# Patient Record
Sex: FEMALE | Race: WHITE | NOT HISPANIC OR LATINO | Employment: FULL TIME | URBAN - METROPOLITAN AREA
[De-identification: names, ages, dates, MRNs, and addresses within clinical notes are randomized per-mention and may not be internally consistent; named-entity substitution may affect disease eponyms.]

---

## 2018-06-18 ENCOUNTER — ANESTHESIA (OUTPATIENT)
Dept: SURGERY | Facility: HOSPITAL | Age: 73
End: 2018-06-18
Payer: MEDICARE

## 2018-06-18 ENCOUNTER — HOSPITAL ENCOUNTER (OUTPATIENT)
Facility: HOSPITAL | Age: 73
Discharge: HOME | End: 2018-06-19
Attending: ORTHOPAEDIC SURGERY | Admitting: ORTHOPAEDIC SURGERY
Payer: MEDICARE

## 2018-06-18 ENCOUNTER — ANESTHESIA EVENT (OUTPATIENT)
Dept: SURGERY | Facility: HOSPITAL | Age: 73
End: 2018-06-18
Payer: MEDICARE

## 2018-06-18 DIAGNOSIS — M79.673 HEEL PAIN: ICD-10-CM

## 2018-06-18 DIAGNOSIS — S92.009A CALCANEAL FRACTURE: Primary | ICD-10-CM

## 2018-06-18 DIAGNOSIS — M25.579 ANKLE PAIN: ICD-10-CM

## 2018-06-18 DIAGNOSIS — M79.673 FOOT PAIN: ICD-10-CM

## 2018-06-18 PROBLEM — S92.002A CALCANEUS FRACTURE, LEFT: Status: ACTIVE | Noted: 2018-06-18

## 2018-06-18 LAB
ALBUMIN SERPL BCP-MCNC: 3.9 G/DL
ALP SERPL-CCNC: 38 U/L
ALT SERPL W/O P-5'-P-CCNC: 21 U/L
ANION GAP SERPL CALC-SCNC: 10 MMOL/L
AST SERPL-CCNC: 25 U/L
BASOPHILS # BLD AUTO: 0.02 K/UL
BASOPHILS NFR BLD: 0.2 %
BILIRUB SERPL-MCNC: 0.6 MG/DL
BUN SERPL-MCNC: 13 MG/DL
CALCIUM SERPL-MCNC: 9.5 MG/DL
CHLORIDE SERPL-SCNC: 103 MMOL/L
CO2 SERPL-SCNC: 27 MMOL/L
CREAT SERPL-MCNC: 1 MG/DL
DIFFERENTIAL METHOD: ABNORMAL
EOSINOPHIL # BLD AUTO: 0.1 K/UL
EOSINOPHIL NFR BLD: 1 %
ERYTHROCYTE [DISTWIDTH] IN BLOOD BY AUTOMATED COUNT: 13 %
EST. GFR  (AFRICAN AMERICAN): >60 ML/MIN/1.73 M^2
EST. GFR  (NON AFRICAN AMERICAN): 57 ML/MIN/1.73 M^2
GLUCOSE SERPL-MCNC: 97 MG/DL
HCT VFR BLD AUTO: 44.1 %
HGB BLD-MCNC: 16 G/DL
LYMPHOCYTES # BLD AUTO: 1.9 K/UL
LYMPHOCYTES NFR BLD: 21 %
MCH RBC QN AUTO: 33.8 PG
MCHC RBC AUTO-ENTMCNC: 36.3 G/DL
MCV RBC AUTO: 93 FL
MONOCYTES # BLD AUTO: 0.7 K/UL
MONOCYTES NFR BLD: 7.7 %
NEUTROPHILS # BLD AUTO: 6.4 K/UL
NEUTROPHILS NFR BLD: 70.1 %
PLATELET # BLD AUTO: 201 K/UL
PMV BLD AUTO: 10.4 FL
POTASSIUM SERPL-SCNC: 3.5 MMOL/L
PROT SERPL-MCNC: 6.9 G/DL
RBC # BLD AUTO: 4.73 M/UL
SODIUM SERPL-SCNC: 140 MMOL/L
WBC # BLD AUTO: 9.11 K/UL

## 2018-06-18 PROCEDURE — 99204 OFFICE O/P NEW MOD 45 MIN: CPT | Mod: 57,AI,, | Performed by: ORTHOPAEDIC SURGERY

## 2018-06-18 PROCEDURE — 63600175 PHARM REV CODE 636 W HCPCS: Performed by: ANESTHESIOLOGY

## 2018-06-18 PROCEDURE — 63600175 PHARM REV CODE 636 W HCPCS: Performed by: ORTHOPAEDIC SURGERY

## 2018-06-18 PROCEDURE — 85025 COMPLETE CBC W/AUTO DIFF WBC: CPT

## 2018-06-18 PROCEDURE — 93010 ELECTROCARDIOGRAM REPORT: CPT | Mod: ,,, | Performed by: INTERNAL MEDICINE

## 2018-06-18 PROCEDURE — 63600175 PHARM REV CODE 636 W HCPCS: Performed by: NURSE ANESTHETIST, CERTIFIED REGISTERED

## 2018-06-18 PROCEDURE — C1713 ANCHOR/SCREW BN/BN,TIS/BN: HCPCS | Performed by: ORTHOPAEDIC SURGERY

## 2018-06-18 PROCEDURE — 25000003 PHARM REV CODE 250: Performed by: NURSE ANESTHETIST, CERTIFIED REGISTERED

## 2018-06-18 PROCEDURE — C1769 GUIDE WIRE: HCPCS | Performed by: ORTHOPAEDIC SURGERY

## 2018-06-18 PROCEDURE — 36000709 HC OR TIME LEV III EA ADD 15 MIN: Performed by: ORTHOPAEDIC SURGERY

## 2018-06-18 PROCEDURE — 71000039 HC RECOVERY, EACH ADD'L HOUR: Performed by: ORTHOPAEDIC SURGERY

## 2018-06-18 PROCEDURE — 25000003 PHARM REV CODE 250: Performed by: PHYSICIAN ASSISTANT

## 2018-06-18 PROCEDURE — 64445 NJX AA&/STRD SCIATIC NRV IMG: CPT | Mod: 59 | Performed by: ANESTHESIOLOGY

## 2018-06-18 PROCEDURE — 36000708 HC OR TIME LEV III 1ST 15 MIN: Performed by: ORTHOPAEDIC SURGERY

## 2018-06-18 PROCEDURE — 99285 EMERGENCY DEPT VISIT HI MDM: CPT | Mod: 25

## 2018-06-18 PROCEDURE — 80053 COMPREHEN METABOLIC PANEL: CPT

## 2018-06-18 PROCEDURE — 36415 COLL VENOUS BLD VENIPUNCTURE: CPT

## 2018-06-18 PROCEDURE — 71000033 HC RECOVERY, INTIAL HOUR: Performed by: ORTHOPAEDIC SURGERY

## 2018-06-18 PROCEDURE — 28415 OPTX CALCANEAL FRACTURE: CPT | Mod: AS,LT,, | Performed by: PHYSICIAN ASSISTANT

## 2018-06-18 PROCEDURE — 27201423 OPTIME MED/SURG SUP & DEVICES STERILE SUPPLY: Performed by: ORTHOPAEDIC SURGERY

## 2018-06-18 PROCEDURE — 28415 OPTX CALCANEAL FRACTURE: CPT | Mod: LT,,, | Performed by: ORTHOPAEDIC SURGERY

## 2018-06-18 PROCEDURE — 76942 ECHO GUIDE FOR BIOPSY: CPT | Performed by: ANESTHESIOLOGY

## 2018-06-18 PROCEDURE — 63600175 PHARM REV CODE 636 W HCPCS: Performed by: PHYSICIAN ASSISTANT

## 2018-06-18 PROCEDURE — 37000008 HC ANESTHESIA 1ST 15 MINUTES: Performed by: ORTHOPAEDIC SURGERY

## 2018-06-18 PROCEDURE — 37000009 HC ANESTHESIA EA ADD 15 MINS: Performed by: ORTHOPAEDIC SURGERY

## 2018-06-18 RX ORDER — FENTANYL CITRATE 50 UG/ML
INJECTION, SOLUTION INTRAMUSCULAR; INTRAVENOUS
Status: DISCONTINUED | OUTPATIENT
Start: 2018-06-18 | End: 2018-06-18

## 2018-06-18 RX ORDER — ROPIVACAINE HYDROCHLORIDE 5 MG/ML
INJECTION, SOLUTION EPIDURAL; INFILTRATION; PERINEURAL
Status: DISCONTINUED | OUTPATIENT
Start: 2018-06-18 | End: 2018-06-18

## 2018-06-18 RX ORDER — CHLORHEXIDINE GLUCONATE ORAL RINSE 1.2 MG/ML
10 SOLUTION DENTAL 2 TIMES DAILY
Status: DISCONTINUED | OUTPATIENT
Start: 2018-06-18 | End: 2018-06-19 | Stop reason: HOSPADM

## 2018-06-18 RX ORDER — GLYCOPYRROLATE 0.2 MG/ML
INJECTION INTRAMUSCULAR; INTRAVENOUS
Status: DISCONTINUED | OUTPATIENT
Start: 2018-06-18 | End: 2018-06-18

## 2018-06-18 RX ORDER — SODIUM CHLORIDE, SODIUM LACTATE, POTASSIUM CHLORIDE, CALCIUM CHLORIDE 600; 310; 30; 20 MG/100ML; MG/100ML; MG/100ML; MG/100ML
INJECTION, SOLUTION INTRAVENOUS CONTINUOUS PRN
Status: DISCONTINUED | OUTPATIENT
Start: 2018-06-18 | End: 2018-06-18

## 2018-06-18 RX ORDER — NEOSTIGMINE METHYLSULFATE 1 MG/ML
INJECTION, SOLUTION INTRAVENOUS
Status: DISCONTINUED | OUTPATIENT
Start: 2018-06-18 | End: 2018-06-18

## 2018-06-18 RX ORDER — ROCURONIUM BROMIDE 10 MG/ML
INJECTION, SOLUTION INTRAVENOUS
Status: DISCONTINUED | OUTPATIENT
Start: 2018-06-18 | End: 2018-06-18

## 2018-06-18 RX ORDER — CEFAZOLIN SODIUM 1 G/3ML
2 INJECTION, POWDER, FOR SOLUTION INTRAMUSCULAR; INTRAVENOUS
Status: DISCONTINUED | OUTPATIENT
Start: 2018-06-18 | End: 2018-06-18 | Stop reason: ALTCHOICE

## 2018-06-18 RX ORDER — OXYCODONE HYDROCHLORIDE 5 MG/1
5 TABLET ORAL
Status: DISCONTINUED | OUTPATIENT
Start: 2018-06-18 | End: 2018-06-18 | Stop reason: HOSPADM

## 2018-06-18 RX ORDER — PROPOFOL 10 MG/ML
VIAL (ML) INTRAVENOUS
Status: DISCONTINUED | OUTPATIENT
Start: 2018-06-18 | End: 2018-06-18

## 2018-06-18 RX ORDER — ONDANSETRON 4 MG/1
8 TABLET, ORALLY DISINTEGRATING ORAL EVERY 8 HOURS PRN
Qty: 30 TABLET | Refills: 0 | Status: ON HOLD | OUTPATIENT
Start: 2018-06-18 | End: 2018-07-13 | Stop reason: SDUPTHER

## 2018-06-18 RX ORDER — ONDANSETRON 2 MG/ML
INJECTION INTRAMUSCULAR; INTRAVENOUS
Status: DISCONTINUED | OUTPATIENT
Start: 2018-06-18 | End: 2018-06-18

## 2018-06-18 RX ORDER — HYDROMORPHONE HYDROCHLORIDE 1 MG/ML
0.2 INJECTION, SOLUTION INTRAMUSCULAR; INTRAVENOUS; SUBCUTANEOUS EVERY 5 MIN PRN
Status: DISCONTINUED | OUTPATIENT
Start: 2018-06-18 | End: 2018-06-18 | Stop reason: HOSPADM

## 2018-06-18 RX ORDER — LIDOCAINE HYDROCHLORIDE 10 MG/ML
INJECTION INFILTRATION; PERINEURAL
Status: DISCONTINUED | OUTPATIENT
Start: 2018-06-18 | End: 2018-06-18

## 2018-06-18 RX ORDER — HYDROCODONE BITARTRATE AND ACETAMINOPHEN 5; 325 MG/1; MG/1
1 TABLET ORAL EVERY 4 HOURS PRN
Status: DISCONTINUED | OUTPATIENT
Start: 2018-06-18 | End: 2018-06-19

## 2018-06-18 RX ORDER — ONDANSETRON 2 MG/ML
4 INJECTION INTRAMUSCULAR; INTRAVENOUS DAILY PRN
Status: DISCONTINUED | OUTPATIENT
Start: 2018-06-18 | End: 2018-06-18 | Stop reason: HOSPADM

## 2018-06-18 RX ORDER — ONDANSETRON 8 MG/1
8 TABLET, ORALLY DISINTEGRATING ORAL EVERY 8 HOURS PRN
Status: DISCONTINUED | OUTPATIENT
Start: 2018-06-18 | End: 2018-06-19 | Stop reason: HOSPADM

## 2018-06-18 RX ORDER — ACETAMINOPHEN 10 MG/ML
1000 INJECTION, SOLUTION INTRAVENOUS ONCE
Status: COMPLETED | OUTPATIENT
Start: 2018-06-18 | End: 2018-06-19

## 2018-06-18 RX ORDER — CEFAZOLIN SODIUM 2 G/50ML
2 SOLUTION INTRAVENOUS
Status: COMPLETED | OUTPATIENT
Start: 2018-06-18 | End: 2018-06-19

## 2018-06-18 RX ORDER — MORPHINE SULFATE 4 MG/ML
2 INJECTION, SOLUTION INTRAMUSCULAR; INTRAVENOUS EVERY 5 MIN PRN
Status: DISCONTINUED | OUTPATIENT
Start: 2018-06-18 | End: 2018-06-18 | Stop reason: HOSPADM

## 2018-06-18 RX ORDER — HYDROCODONE BITARTRATE AND ACETAMINOPHEN 10; 325 MG/1; MG/1
1 TABLET ORAL EVERY 4 HOURS PRN
Status: DISCONTINUED | OUTPATIENT
Start: 2018-06-18 | End: 2018-06-19

## 2018-06-18 RX ORDER — ROPIVACAINE HYDROCHLORIDE 5 MG/ML
INJECTION, SOLUTION EPIDURAL; INFILTRATION; PERINEURAL
Status: COMPLETED
Start: 2018-06-18 | End: 2018-06-18

## 2018-06-18 RX ORDER — HYDROCODONE BITARTRATE AND ACETAMINOPHEN 5; 325 MG/1; MG/1
TABLET ORAL
Qty: 30 TABLET | Refills: 0 | Status: SHIPPED | OUTPATIENT
Start: 2018-06-18 | End: 2018-06-19 | Stop reason: HOSPADM

## 2018-06-18 RX ORDER — MIDAZOLAM HYDROCHLORIDE 1 MG/ML
INJECTION, SOLUTION INTRAMUSCULAR; INTRAVENOUS
Status: DISCONTINUED | OUTPATIENT
Start: 2018-06-18 | End: 2018-06-18

## 2018-06-18 RX ORDER — SODIUM CHLORIDE 0.9 % (FLUSH) 0.9 %
3 SYRINGE (ML) INJECTION
Status: DISCONTINUED | OUTPATIENT
Start: 2018-06-18 | End: 2018-06-18 | Stop reason: HOSPADM

## 2018-06-18 RX ORDER — DOCUSATE SODIUM 100 MG/1
100 CAPSULE, LIQUID FILLED ORAL 2 TIMES DAILY
Refills: 0 | COMMUNITY
Start: 2018-06-18 | End: 2018-07-02

## 2018-06-18 RX ORDER — EPHEDRINE SULFATE 50 MG/ML
INJECTION, SOLUTION INTRAVENOUS
Status: DISCONTINUED | OUTPATIENT
Start: 2018-06-18 | End: 2018-06-18

## 2018-06-18 RX ADMIN — ROPIVACAINE HYDROCHLORIDE 30 ML: 5 INJECTION, SOLUTION EPIDURAL; INFILTRATION; PERINEURAL at 07:06

## 2018-06-18 RX ADMIN — CHLORHEXIDINE GLUCONATE 10 ML: 1.2 RINSE ORAL at 08:06

## 2018-06-18 RX ADMIN — PROPOFOL 100 MG: 10 INJECTION, EMULSION INTRAVENOUS at 05:06

## 2018-06-18 RX ADMIN — ONDANSETRON 4 MG: 2 INJECTION, SOLUTION INTRAMUSCULAR; INTRAVENOUS at 06:06

## 2018-06-18 RX ADMIN — FENTANYL CITRATE 50 MCG: 50 INJECTION, SOLUTION INTRAMUSCULAR; INTRAVENOUS at 04:06

## 2018-06-18 RX ADMIN — EPHEDRINE SULFATE 5 MG: 50 INJECTION, SOLUTION INTRAMUSCULAR; INTRAVENOUS; SUBCUTANEOUS at 06:06

## 2018-06-18 RX ADMIN — CEFAZOLIN SODIUM 2 G: 2 SOLUTION INTRAVENOUS at 11:06

## 2018-06-18 RX ADMIN — NEOSTIGMINE METHYLSULFATE 3 MG: 1 INJECTION INTRAVENOUS at 06:06

## 2018-06-18 RX ADMIN — MORPHINE SULFATE 2 MG: 4 INJECTION INTRAVENOUS at 06:06

## 2018-06-18 RX ADMIN — EPHEDRINE SULFATE 5 MG: 50 INJECTION, SOLUTION INTRAMUSCULAR; INTRAVENOUS; SUBCUTANEOUS at 05:06

## 2018-06-18 RX ADMIN — SODIUM CHLORIDE, SODIUM LACTATE, POTASSIUM CHLORIDE, AND CALCIUM CHLORIDE: 600; 310; 30; 20 INJECTION, SOLUTION INTRAVENOUS at 04:06

## 2018-06-18 RX ADMIN — ACETAMINOPHEN 1000 MG: 10 INJECTION, SOLUTION INTRAVENOUS at 07:06

## 2018-06-18 RX ADMIN — HYDROCODONE BITARTRATE AND ACETAMINOPHEN 1 TABLET: 5; 325 TABLET ORAL at 11:06

## 2018-06-18 RX ADMIN — MIDAZOLAM 2 MG: 1 INJECTION INTRAMUSCULAR; INTRAVENOUS at 04:06

## 2018-06-18 RX ADMIN — LIDOCAINE HYDROCHLORIDE 40 MG: 10 INJECTION, SOLUTION INFILTRATION; PERINEURAL at 04:06

## 2018-06-18 RX ADMIN — PROPOFOL 100 MG: 10 INJECTION, EMULSION INTRAVENOUS at 04:06

## 2018-06-18 RX ADMIN — ROBINUL 0.4 MG: 0.2 INJECTION INTRAMUSCULAR; INTRAVENOUS at 06:06

## 2018-06-18 RX ADMIN — FENTANYL CITRATE 100 MCG: 50 INJECTION, SOLUTION INTRAMUSCULAR; INTRAVENOUS at 04:06

## 2018-06-18 RX ADMIN — CEFAZOLIN 2 G: 1 INJECTION, POWDER, FOR SOLUTION INTRAMUSCULAR; INTRAVENOUS at 04:06

## 2018-06-18 RX ADMIN — FENTANYL CITRATE 50 MCG: 50 INJECTION, SOLUTION INTRAMUSCULAR; INTRAVENOUS at 05:06

## 2018-06-18 RX ADMIN — ROCURONIUM BROMIDE 40 MG: 10 INJECTION, SOLUTION INTRAVENOUS at 04:06

## 2018-06-18 RX ADMIN — SODIUM CHLORIDE, SODIUM LACTATE, POTASSIUM CHLORIDE, AND CALCIUM CHLORIDE: 600; 310; 30; 20 INJECTION, SOLUTION INTRAVENOUS at 05:06

## 2018-06-18 NOTE — H&P
"Subjective:     Patient ID: Nayana Bledsoe is a 73 y.o. female.    Chief Complaint: Foot Injury (L heel. Fell from 4ft ladder. Denies hitting head, no LOC. )    Left foot posterior pain. Unable to walk on it. Fell off 4 foot ladder today. No knee or hip pain significant. Unable to ambulate after her fall. No LOC.       Foot Injury    The pain is present in the left foot. This is a new problem. The current episode started today. There has been a history of trauma. The injury was the result of a falling action The problem has been rapidly worsening. The quality of the pain is described as aching, dull and sharp. Associated symptoms include an inability to bear weight. Pertinent negatives include no fever or itching. The symptoms are aggravated by activity and bearing weight.       History reviewed. No pertinent past medical history.  Past Surgical History:   Procedure Laterality Date    CHOLECYSTECTOMY      HYSTERECTOMY       History reviewed. No pertinent family history.  Social History     Social History    Marital status:      Spouse name: N/A    Number of children: N/A    Years of education: N/A     Occupational History    Not on file.     Social History Main Topics    Smoking status: Never Smoker    Smokeless tobacco: Never Used    Alcohol use Yes    Drug use: Unknown    Sexual activity: Not on file     Other Topics Concern    Not on file     Social History Narrative    No narrative on file     There are no discharge medications for this patient.    Review of patient's allergies indicates:   Allergen Reactions    Codeine      "i dont like the funny feeling it gives me"     Review of Systems   Constitution: Negative for fever.   HENT: Negative for sore throat.    Eyes: Negative for blurred vision.   Cardiovascular: Negative for dyspnea on exertion.   Respiratory: Negative for shortness of breath.    Hematologic/Lymphatic: Does not bruise/bleed easily.   Skin: Negative for itching. "   Gastrointestinal: Negative for vomiting.   Genitourinary: Negative for dysuria.   Neurological: Negative for dizziness.   Psychiatric/Behavioral: The patient does not have insomnia.        Objective:   Body mass index is 27.06 kg/m².  Vitals:    06/18/18 1245   BP: 130/70   Pulse: 70   Resp: 18   Temp: 98.1 °F (36.7 °C)           General    Nursing note and vitals reviewed.  Constitutional: She is oriented to person, place, and time. She appears well-developed. No distress.   HENT:   Head: Normocephalic and atraumatic.   Eyes: EOM are normal.   Cardiovascular: Normal rate.    Pulmonary/Chest: Effort normal. No stridor.   Neurological: She is alert and oriented to person, place, and time.   Psychiatric: She has a normal mood and affect. Her behavior is normal.         Left Ankle/Foot Exam     Inspection  Deformity: present  Bruising: Foot - present    Other   Sensation: normal    Comments:  Skin posteriorly with mild tenting. No virginie necrosis.      Vascular Exam       Left Pulses  Dorsalis Pedis:      2+            Non tender to palpation over the remainder of the left lower and right lower extremities. Tolerates ROM to knees and hips without pain or discomfort.      IMAGING AP and lateral oblique left foot and ankle views show tongue type calcaneus fracture with extension to the posterior facet. No other fracture or dislocation seen.    Assessment:     74 yo F with left ankle fracture achilles avulsion / tongue type fracture with posterior facet extension    Plan:       CT scan L foot and ankle to evaluate extension to the posterior facet - surgical planning  Recommend L foot calcaneus ORIF urgently to protect posterior skin  We reviewed with Virginia today, the pathology and natural history of her diagnosis.  I also explained the indications, risks and benefits of surgery. After discussion, Virginia decided to proceed with surgery. The decision was made to go forward with ORIF left calcaneus.    Soft tissue is  appropriate for surgery. No significant swelling or ecchymosis, excellent skin wrinkles.    The details of the surgical procedure were explained, including the location of probable incisions and a description of likely hardware and/or grafts to be used.  The patient understands the likely convalescence after surgery.  Also, we have thoroughly discussed the risks, benefits and alternatives to surgery, including, but not limited to, the risk of infection, joint stiffness, blood clot (including DVT and/or pulmonary embolus), neurologic and vascular injury.  It was explained that, if tissue has been repaired or reconstructed, there is a chance of failure, which may require further management.      All of the patient's questions were answered and informed consent was obtained. The patient will contact us if they have any questions or concerns in the interim.

## 2018-06-18 NOTE — ANESTHESIA PREPROCEDURE EVALUATION
06/18/2018  Nayana Bledsoe is a 73 y.o., female.    Anesthesia Evaluation    I have reviewed the Patient Summary Reports.        Review of Systems  Anesthesia Hx:  No problems with previous Anesthesia  History of prior surgery of interest to airway management or planning: Denies Family Hx of Anesthesia complications.   Denies Personal Hx of Anesthesia complications.   Cardiovascular:   Denies Hypertension.  Denies MI.  Denies CAD.    ECG has been reviewed. Carotid Stenosis   Pulmonary:   Denies COPD.  Denies Asthma.  Denies Shortness of breath.    Renal/:   Denies Chronic Renal Disease.     Hepatic/GI:   Hiatal Hernia, GERD    Neurological:   Denies CVA. Neuromuscular Disease,  Denies Seizures.    Endocrine:   Denies Diabetes.        Physical Exam  General:  Well nourished    Airway/Jaw/Neck:  Airway Findings: Mouth Opening: Normal General Airway Assessment: Adult  Mallampati: II       Chest/Lungs:  Chest/Lungs Findings: Clear to auscultation, Normal Respiratory Rate     Heart/Vascular:  Heart Findings: Rate: Normal  Rhythm: Regular Rhythm  Sounds: Normal             Anesthesia Plan  Type of Anesthesia, risks & benefits discussed:  Anesthesia Type:  general  Patient's Preference:   Intra-op Monitoring Plan: standard ASA monitors  Intra-op Monitoring Plan Comments:   Post Op Pain Control Plan: peripheral nerve block  Post Op Pain Control Plan Comments:   Induction:   IV  Beta Blocker:  Patient is not currently on a Beta-Blocker (No further documentation required).       Informed Consent: Patient understands risks and agrees with Anesthesia plan.  Questions answered.   ASA Score: 2  emergent   Day of Surgery Review of History & Physical:            Ready For Surgery From Anesthesia Perspective.

## 2018-06-18 NOTE — OP NOTE
"Operative Note       Surgery Date: 6/18/2018     Surgeon(s) and Role:     * Jim Maria MD - Primary  Tori Payton PA-C (Assisting)    The use of Tori Payton PA-C as an assistant was medically necessary for patient positioning, skin retraction, closure and assistance with this procedure. The procedure could not be performed properly without the use of her as an assistant. There was no qualified resident/fellow available for assistance with this procedure.       Pre-op Diagnosis:      Left closed displaced calcaneus fracture    Post-op Diagnosis: Same    Procedure(s) (LRB):      1. Open reduction and internal fixation left displaced calcaneus fracture  2. Application of posterior mold splint left leg    Anesthesia: General    Estimated Blood Loss: 50 cc           Specimens: None    Implants:   Implant Name Type Inv. Item Serial No.  Lot No. LRB No. Used   SCREW 4.5MM DALIA 44MM FTHD - [id number]  SCREW 4.5MM DALIA 44MM FTHD  SYNTHES  Left 1   K-WIRE THRD TRCR PT 4.5MM 1.6X - [id number]  K-WIRE THRD TRCR PT 4.5MM 1.6X  SYNTHES  Left 3   WIRE 2.8 X 300 MM THREADED - [id number]  WIRE 2.8 X 300 MM THREADED  SYNTHES  Left 1   6.5x40mm canulated screw           Left 2       OPERATIVE INDICATIONS: Nayana Bledsoe is a 73 y.o. female who presented to the ED with closed displaced left achilles avulsion / "tongue type" calcaneus fracture after a fall from a 4 foot ladder. Skin was tented but no open fracture. She denied other injury, no other extremity pain, no LOC. Due to urgency of the case given pressure on the skin, recommendation for left calcaneus open reduction and internal fixation. Soft tissues were otherwise appropriate. No significant swelling, excellent skin wrinkles. Risks of surgery including but not limited to infection, damage to blood vessels and nerves, tendons, fracture non union, fracture malunion, displacement or loss of reduction, hardware irritation, risks of anesthesia " including but not limited to heart attack, stroke, death, possible need for more surgery. She and her family expressed good understanding and wished to proceed. No guarantees given nor implied.     OPERATIVE DETAILS:    The patient was brought to the operating room and general anesthesia was induced without incident. The patient was positioned in the lateral decubitus position with the left side up, all bony prominences well padded. Padded peroneal nerve. The left lower extremity was prepped and draped in normal sterile fashion. Timeout was performed identifying proper patient, proper procedure and proper extremity and site, all in the room agreed. She received 2 g of ancef before skin incision. The incision was marked for limited approach just lateral to the achilles tendon. Skin incision was made only with 15 blade. Tenotomy scissors were used to develop the plane here and every effort was made to protect the sural nerve at all times. The fracture was identified and fracture hematoma was encountered and released. The edges of the fracture were identified and cleaned preserving soft tissue. Point to point clamp was used to carefully reduce the fracture and interdigitated nicely. This was checked on the lateral, Broden view, and Noel views and looked to be appropriate. Two 6.5 partially threaded stainless steel Synthes screws were placed bi cortically perpendicular to the fracture plane, anterior to the achilles tendon, and posterior to the posterior facet. These both had excellent purchase. Reduction forceps was removed and there was good maintenance of the fracture. With 5 degrees of dorsiflexion there was no gapping at the fracture. The wound was copiously irrigated with 3 L of normal saline. Hemostasis was achieved. The wound was closed in layers with 3-0 Vicryl for subcutaneous / dermal sutures. 4-0 Monocryl was used in interrupted fashion for the skin. Patient tolerated the procedure well with no  complications.  All sponge counts were correct at the end the procedure. Posterior mold splint was applied.  There was neutral flexion to the ankle. Splint was very well padded. She was transferred to the PACU in stable condition.

## 2018-06-18 NOTE — TRANSFER OF CARE
"Anesthesia Transfer of Care Note    Patient: Nayana Bledsoe    Procedure(s) Performed: Procedure(s) (LRB):  ORIF, FOOT (Left)    Patient location: PACU    Anesthesia Type: general    Transport from OR: Transported from OR on room air with adequate spontaneous ventilation    Post pain: adequate analgesia    Post assessment: no apparent anesthetic complications    Post vital signs: stable    Level of consciousness: awake, alert and oriented    Nausea/Vomiting: no nausea/vomiting    Complications: none    Transfer of care protocol was followed      Last vitals:   Visit Vitals  /70 (BP Location: Right arm, Patient Position: Sitting)   Pulse 70   Temp 36.7 °C (98.1 °F) (Oral)   Resp 18   Ht 4' 11" (1.499 m)   Wt 60.8 kg (134 lb)   SpO2 95%   Breastfeeding? No   BMI 27.06 kg/m²     "

## 2018-06-18 NOTE — ED PROVIDER NOTES
"Encounter Date: 6/18/2018       History     Chief Complaint   Patient presents with    Foot Injury     L heel. Fell from 4ft ladder. Denies hitting head, no LOC.      73 year old female with complaint of left heel pain X 2 hours.  Pt reports that she fell off a 4 foot ladder and she slipped and fell and landed on feet.  Reports immediate onset of pain.  Unable to bear weight.  No other complaints.  No back or knee pain.            Review of patient's allergies indicates:   Allergen Reactions    Codeine      "i dont like the funny feeling it gives me"     History reviewed. No pertinent past medical history.  Past Surgical History:   Procedure Laterality Date    CHOLECYSTECTOMY      HYSTERECTOMY       History reviewed. No pertinent family history.  Social History   Substance Use Topics    Smoking status: Never Smoker    Smokeless tobacco: Never Used    Alcohol use Yes     Review of Systems   Constitutional: Negative for fever.   HENT: Negative for sore throat.    Respiratory: Negative for shortness of breath.    Cardiovascular: Negative for chest pain.   Gastrointestinal: Negative for nausea.   Genitourinary: Negative for dysuria.   Musculoskeletal: Negative for back pain.        Left foot pain    Skin: Negative for rash.   Neurological: Negative for weakness.   Hematological: Does not bruise/bleed easily.       Physical Exam     Initial Vitals [06/18/18 1245]   BP Pulse Resp Temp SpO2   130/70 70 18 98.1 °F (36.7 °C) 95 %      MAP       --         Physical Exam    Nursing note and vitals reviewed.  Constitutional: She appears well-developed and well-nourished.   HENT:   Head: Normocephalic and atraumatic.   Eyes: Conjunctivae and EOM are normal. Pupils are equal, round, and reactive to light.   Neck: Normal range of motion. Neck supple.   Cardiovascular: Normal rate, regular rhythm, normal heart sounds and intact distal pulses.   Pulmonary/Chest: Breath sounds normal.   Abdominal: Soft. There is no tenderness. " There is no rebound and no guarding.   Musculoskeletal:   Tenderness and swelling with tenting of the skin left calcaneal region, mild left medial ankle tenderness and swelling, mild left lateral foot tenderness, no knee tenderness, no hip tenderness, no spine tenderness   Neurological: She is alert and oriented to person, place, and time. She has normal strength and normal reflexes.   Skin: Skin is warm and dry.   Psychiatric: She has a normal mood and affect. Her behavior is normal. Thought content normal.         ED Course   Procedures  Labs Reviewed   CBC W/ AUTO DIFFERENTIAL - Abnormal; Notable for the following:        Result Value    MCH 33.8 (*)     MCHC 36.3 (*)     All other components within normal limits   COMPREHENSIVE METABOLIC PANEL          X-Ray Foot Complete Left   Final Result      There is an acute appearing fracture involving the posterior aspect of the calcaneus. The bony fracture fragments of the calcaneus are displaced by 7 mm.         Electronically signed by: Ramez Parada MD   Date:    06/18/2018   Time:    13:55      X-Ray Ankle Complete Left   Final Result      There is an acute appearing fracture involving the posterior aspect of the calcaneus. The bony fracture fragments of the calcaneus are displaced by 7 mm.         Electronically signed by: Ramez Parada MD   Date:    06/18/2018   Time:    13:53      X-Ray Calcaneus 2 View Left   Final Result      There is an acute appearing fracture involving the posterior aspect of the calcaneus. The bony fracture fragments of the calcaneus are displaced by 7 mm.         Electronically signed by: Ramez Parada MD   Date:    06/18/2018   Time:    13:52      X-Ray Chest 1 View    (Results Pending)   CT Ankle (Including Hindfoot) Without Contrast Left    (Results Pending)         1:26 PM  Pt denies wanting pain medication at present                    Clinical Impression:   Diagnoses of Foot pain, Ankle pain, Heel pain, and Calcaneal fracture  were pertinent to this visit.                             Zachary Sandoval, VINCENT  06/18/18 4391

## 2018-06-19 VITALS
RESPIRATION RATE: 17 BRPM | BODY MASS INDEX: 27.33 KG/M2 | DIASTOLIC BLOOD PRESSURE: 54 MMHG | SYSTOLIC BLOOD PRESSURE: 104 MMHG | HEART RATE: 59 BPM | TEMPERATURE: 98 F | WEIGHT: 135.56 LBS | OXYGEN SATURATION: 98 % | HEIGHT: 59 IN

## 2018-06-19 PROBLEM — S92.002A CALCANEUS FRACTURE, LEFT: Chronic | Status: ACTIVE | Noted: 2018-06-18

## 2018-06-19 PROCEDURE — 97162 PT EVAL MOD COMPLEX 30 MIN: CPT

## 2018-06-19 PROCEDURE — G8979 MOBILITY GOAL STATUS: HCPCS | Mod: CI

## 2018-06-19 PROCEDURE — G8978 MOBILITY CURRENT STATUS: HCPCS | Mod: CJ

## 2018-06-19 PROCEDURE — 94761 N-INVAS EAR/PLS OXIMETRY MLT: CPT

## 2018-06-19 PROCEDURE — 97116 GAIT TRAINING THERAPY: CPT

## 2018-06-19 PROCEDURE — 63600175 PHARM REV CODE 636 W HCPCS: Performed by: PHYSICIAN ASSISTANT

## 2018-06-19 PROCEDURE — 25000003 PHARM REV CODE 250: Performed by: NURSE PRACTITIONER

## 2018-06-19 PROCEDURE — 25000003 PHARM REV CODE 250: Performed by: PHYSICIAN ASSISTANT

## 2018-06-19 PROCEDURE — 25000003 PHARM REV CODE 250: Performed by: ORTHOPAEDIC SURGERY

## 2018-06-19 RX ORDER — OXYCODONE AND ACETAMINOPHEN 7.5; 325 MG/1; MG/1
1 TABLET ORAL EVERY 4 HOURS PRN
Status: DISCONTINUED | OUTPATIENT
Start: 2018-06-19 | End: 2018-06-19 | Stop reason: HOSPADM

## 2018-06-19 RX ORDER — ENOXAPARIN SODIUM 100 MG/ML
40 INJECTION SUBCUTANEOUS DAILY
Status: DISCONTINUED | OUTPATIENT
Start: 2018-06-19 | End: 2018-06-19 | Stop reason: HOSPADM

## 2018-06-19 RX ORDER — CALCIUM CARBONATE 200(500)MG
500 TABLET,CHEWABLE ORAL DAILY PRN
Status: DISCONTINUED | OUTPATIENT
Start: 2018-06-19 | End: 2018-06-19 | Stop reason: HOSPADM

## 2018-06-19 RX ORDER — OXYCODONE AND ACETAMINOPHEN 7.5; 325 MG/1; MG/1
2 TABLET ORAL EVERY 4 HOURS PRN
Status: DISCONTINUED | OUTPATIENT
Start: 2018-06-19 | End: 2018-06-19 | Stop reason: HOSPADM

## 2018-06-19 RX ORDER — OXYCODONE AND ACETAMINOPHEN 5; 325 MG/1; MG/1
TABLET ORAL
Qty: 40 TABLET | Refills: 0 | Status: ON HOLD | OUTPATIENT
Start: 2018-06-19 | End: 2018-07-13 | Stop reason: SDUPTHER

## 2018-06-19 RX ORDER — RAMELTEON 8 MG/1
8 TABLET ORAL NIGHTLY PRN
Status: DISCONTINUED | OUTPATIENT
Start: 2018-06-19 | End: 2018-06-19 | Stop reason: HOSPADM

## 2018-06-19 RX ORDER — KETOROLAC TROMETHAMINE 30 MG/ML
30 INJECTION, SOLUTION INTRAMUSCULAR; INTRAVENOUS ONCE
Status: COMPLETED | OUTPATIENT
Start: 2018-06-19 | End: 2018-06-19

## 2018-06-19 RX ORDER — ASPIRIN 81 MG/1
81 TABLET ORAL DAILY
Refills: 0 | COMMUNITY
Start: 2018-06-20 | End: 2018-07-20

## 2018-06-19 RX ADMIN — CEFAZOLIN SODIUM 2 G: 2 SOLUTION INTRAVENOUS at 10:06

## 2018-06-19 RX ADMIN — CHLORHEXIDINE GLUCONATE 10 ML: 1.2 RINSE ORAL at 08:06

## 2018-06-19 RX ADMIN — RAMELTEON 8 MG: 8 TABLET, FILM COATED ORAL at 02:06

## 2018-06-19 RX ADMIN — OXYCODONE HYDROCHLORIDE AND ACETAMINOPHEN 2 TABLET: 7.5; 325 TABLET ORAL at 02:06

## 2018-06-19 RX ADMIN — HYDROCODONE BITARTRATE AND ACETAMINOPHEN 1 TABLET: 10; 325 TABLET ORAL at 03:06

## 2018-06-19 RX ADMIN — HYDROCODONE BITARTRATE AND ACETAMINOPHEN 1 TABLET: 10; 325 TABLET ORAL at 07:06

## 2018-06-19 RX ADMIN — CEFAZOLIN SODIUM 2 G: 2 SOLUTION INTRAVENOUS at 05:06

## 2018-06-19 RX ADMIN — CALCIUM CARBONATE (ANTACID) CHEW TAB 500 MG 500 MG: 500 CHEW TAB at 02:06

## 2018-06-19 RX ADMIN — KETOROLAC TROMETHAMINE 30 MG: 30 INJECTION, SOLUTION INTRAMUSCULAR; INTRAVENOUS at 03:06

## 2018-06-19 NOTE — PROGRESS NOTES
Post Operative Day 1:    Nursing staff states patent is unable to feel her foot status post left calcaneal surgery.  Patient states that after surgery she has lost sensation in her foot.  Patient did receive a postoperative nerve block under US guidance.       AA0x3, VSS.  No distress.      Patient has a soft cast covering from below the knee to her toes. Upon examination patient has sensation thru anterior and posterior aspect of proximal 1/3rd of calf.  Unable to examine portion of leg distal to this due to cast location.  Patient lacks both motor ability and sensation in all 5 toes.  Patient is able to bend knee without difficulty.     Notified Dr Maria and will have him examine patient leg.   Possible this deficit is secondary to nerve block.  If does not resolve in 24 hours, patient may need further work up.      -Tristian

## 2018-06-19 NOTE — CONSULTS
"CM spoke with Evelina with [facility]. CM received permission to pull walker. CM delivered walker to bedside. Blue instruction sheet for walker given [facility]. Delivery ticket signed by patient. Faxed completed packet to Aleja VASQUEZ [phone] with signed delivery ticket, order, facesheet and H&P.         Your fax has been successfully sent to [phone] at [phone].  ------------------------------------------------------------  From: [id number]  ------------------------------------------------------------  6/19/2018 12:30:31 PM Transmission Record   Sent to [phone] with remote ID "   Result: (3/259;0/0) Dial Er:no loop current   Page record: NONE SENT   Elapsed time: 00:36 on channel 58    6/19/2018 12:36:09 PM Transmission Record   Sent to [phone] with remote [facility] "   Result: (0/339;0/0) Success   Page record: 1 - 13   Elapsed time: 04:17 on channel 52      "

## 2018-06-19 NOTE — ANESTHESIA PROCEDURE NOTES
Peripheral    Patient location during procedure: pre-op   Block not for primary anesthetic.  Reason for block: at surgeon's request and post-op pain management   Post-op Pain Location: Left foot  Start time: 6/18/2018 7:07 PM  Timeout: 6/18/2018 7:01 PM   End time: 6/18/2018 7:07 PM  Surgery related to: Left calcaneous fx  Staffing  Anesthesiologist: BHAVANA CHILDERS  Performed: anesthesiologist   Preanesthetic Checklist  Completed: patient identified, site marked, surgical consent, pre-op evaluation, timeout performed, IV checked, risks and benefits discussed and monitors and equipment checked  Peripheral Block  Patient position: supine  Prep: ChloraPrep  Patient monitoring: heart rate, cardiac monitor, continuous pulse ox, continuous capnometry and frequent blood pressure checks  Block type: popliteal  Laterality: left  Injection technique: single shot  Needle  Needle type: Stimuplex   Needle gauge: 21 G  Needle length: 4 in  Needle localization: anatomical landmarks, ultrasound guidance, paresthesias and nerve stimulator   -ultrasound image captured on disc.  Assessment  Injection assessment: negative aspiration, negative parasthesia and local visualized surrounding nerve  Paresthesia pain: none  Heart rate change: no  Slow fractionated injection: yes  Medications:  Bolus administered: 30 mL of 0.5 ropivacaine  Additional Notes  VSS.  [facility] RN monitoring vitals throughout procedure.  Patient tolerated procedure well.

## 2018-06-19 NOTE — PROGRESS NOTES
Orthopedic Sports Surgery Rounding Note     2018    The patient is s/p ORIF of left calcaneal fracture. Patient's block is wearing off and she is able to have more ROM of her toes and more sensation.     Vital Signs:    Vitals:    18 0732 18 0831 18 1123 18 1542   BP: 105/71  119/66 (!) 104/54   BP Location: Left arm  Right arm Right arm   Patient Position:   Lying Lying   Pulse: 65 62 62 (!) 59   Resp: 15  16 17   Temp: 98.6 °F (37 °C)  98.2 °F (36.8 °C) 97.9 °F (36.6 °C)   TempSrc: Oral  Oral Oral   SpO2: (!) 94% 95% (!) 94% 98%   Weight:       Height:           Temp (48hrs), Av.1 °F (36.7 °C), Min:97.9 °F (36.6 °C), Max:98.6 °F (37 °C)      Recent Lab Results:    Recent Labs  Lab 18  1400      K 3.5      CO2 27   BUN 13   CREATININE 1.0   GLU 97   CALCIUM 9.5     Recent Labs      18   1400   WBC  9.11   HGB  16.0   HCT  44.1   PLT  201         Physical Exam:  A/O *3.     Left Lower Extremity  Dressing/Incision C/D/I  Decreased sensation over dorsum of foot and toes  No significant pain with passive ROM of toes    Assessment:  Left calcaneal fracture, S/P ORIF, POD # 1    Plan:  Pain Control with percocet 5-325 mg  PT/OT demonstrated gait training w/ walker  DVT prophylaxis with ASA for home use  Left LE NWB  D/C planning with [facility]  Patient is expected to be discharged home today  IV Toradol ordered for patient before she is discharged to home  Follow-up in orthopedic clinic in 1 week    Tori Payton PA-C  Orthopedic Surgery

## 2018-06-19 NOTE — PLAN OF CARE
Problem: Patient Care Overview  Goal: Plan of Care Review  Outcome: Ongoing (interventions implemented as appropriate)  Patient and vital signs stable. POC reviewed. IV antibiotics administered as ordered. No s/s of distress. Will continue to monitor.

## 2018-06-19 NOTE — CONSULTS
" faxed order for walker to [facility] -[phone]. Awaiting phone call from [facility] for delivery.       Your fax has been successfully sent to [phone] at [phone].  ------------------------------------------------------------  From: [id number]  ------------------------------------------------------------  6/19/2018 11:21:22 AM Transmission Record   Sent to [phone] with remote ID "[facility]ax "   Result: (0/339;0/0) Success   Page record: 1 - 11   Elapsed time: 03:38 on channel 26      "

## 2018-06-19 NOTE — PLAN OF CARE
06/19/18 1154   CORNEJO Message   Medicare Outpatient and Observation Notification regarding financial responsibility Given to patient/caregiver;Explained to patient/caregiver;Signed/date by patient/caregiver   Date CORNEJO was signed 06/19/18   Time CORNEJO was signed 1150

## 2018-06-19 NOTE — PLAN OF CARE
CM met with patient,  at bedside. Discharge planning assessment completed. Patient lives with . Discussed order for walker. No other d/c needs identified by patient at this time. Payor:[employer] MA[employer]ED [employer]IC[employer].       06/19/18 115   Discharge Assessment   Assessment Type Discharge Planning Assessment   Confirmed/corrected address and phone number on facesheet? Yes   Assessment information obtained from? Patient   Communicated expected length of stay with patient/caregiver yes   Prior to hospitilization cognitive status: Alert/Oriented   Prior to hospitalization functional status: Independent   Current cognitive status: Alert/Oriented   Current Functional Status: Needs Assistance;Assistive Equipment   Lives With spouse   Able to Return to Prior Arrangements yes   Is patient able to care for self after discharge? Yes   Who are your caregiver(s) and their phone number(s)? Codey Bledsoe son [phone]   Patient's perception of discharge disposition home or selfcare   Patient currently being followed by outpatient case management? Unable to determine (comments)   Patient currently receives any other outside agency services? No   Equipment Currently Used at Home walker, rolling   Do you have any problems affording any of your prescribed medications? No   Is the patient taking medications as prescribed? yes   Does the patient have transportation home? Yes   Transportation Available family or friend will provide   Dialysis Name and Scheduled days N/A   Does the patient receive services at the [facility]madin Clinic? No   Discharge Plan A Home with family   Discharge Plan B Home with family   Patient/Family In Agreement With Plan yes

## 2018-06-19 NOTE — OR NURSING
Left popliteal nerve block complete per Dr. Pedraza with no complications, will continue to monitor the patient closely.

## 2018-06-19 NOTE — PT/OT/SLP EVAL
Physical Therapy Evaluation    Patient Name:  Nayana Bledsoe   MRN:  [id number]    Recommendations:     Discharge Recommendations:  home health PT   Discharge Equipment Recommendations: none   Barriers to discharge: None    Assessment:     Nayana Bledsoe is a 73 y.o. female admitted with a medical diagnosis of Calcaneal fracture.  She presents with the following impairments/functional limitations:  gait instability, impaired endurance, decreased lower extremity function, decreased safety awareness, impaired self care skills, impaired functional mobilty, orthopedic precautions.    Rehab Prognosis:  GOOD; patient would benefit from acute skilled PT services to address these deficits and reach maximum level of function.      Recent Surgery: Procedure(s) (LRB):  ORIF, FOOT (Left) 1 Day Post-Op    Plan:     During this hospitalization, patient to be seen 5 x/week to address the above listed problems via gait training, therapeutic activities, therapeutic exercises  · Plan of Care Expires:  06/26/18   Plan of Care Reviewed with: patient, spouse    Subjective     Communicated with NURSE MILLS AND EPIC CHART REVIEW prior to session.  Patient found SUP IN BED upon PT entry to room, agreeable to evaluation.      Chief Complaint: IMPAIRED FUNCTIONAL MOBILITY  Patient comments/goals: RETURN TO PLOF  Pain/Comfort:  · Pain Rating 1: 7/10  · Location - Side 1: Left  · Location 1: ankle    Patients cultural, spiritual, Druze conflicts given the current situation:     Living Environment:  PT LIVES WITH HER  IN A ONE STORY HOME WITH NO STEPS TO ENTER. PT WORKS AS A  BUT IS OFF FOR THE SUMMER.   Prior to admission, patients level of function was I IN WALKING, DRIVING, BATHING/DRESSING.  Patient has the following equipment: none.  DME owned (not currently used): rolling walker, shower chair and transfer tub bench.  Upon discharge, patient will have assistance from .    Objective:     Patient found  with: telemetry     General Precautions: Standard  Orthopedic Precautions:LLE non weight bearing   Braces: N/A     Exams:  · Cognitive Exam:  Patient is oriented to Person, Place, Time and Situation and follows 100% of SIMPLE commands   · Gross Motor Coordination:  WFL  · Sensation:    · -       Intact  · RLE ROM: WNL  · RLE Strength: 5/5  · LLE ROM: WNL  · LLE Strength: 5/5 EXCEPT ANKLE NT    Functional Mobility:  · Bed Mobility:     · Supine to Sit: stand by assistance  · Transfers:     · Sit to Stand:  contact guard assistance with rolling walker  · Gait: GT 30' X 2 WITH RW AND BRENDON USING A 3 POINT GAIT PATTERN, CONSISTENT CUES FOR SAFETY  · Balance: FAIR-    AM-PAC 6 CLICK MOBILITY  Total Score:18     Therapeutic Activities and Exercises:   PT WAS QUICK AND IMPULSIVE WITH TF. PT WAS EDU ON SAFETY AWARENESS AND PROPER USE OF AD. PT DEMONSTRATED UNDERSTANDING OF USE OF AD VIA TEACH BACK METHOD.     Patient left up in chair with all lines intact, call button in reach, NURSE notified and  present.    GOALS:    Physical Therapy Goals        Problem: Physical Therapy Goal    Goal Priority Disciplines Outcome Goal Variances Interventions   Physical Therapy Goal     PT/OT, PT      Description:  LTG'S TO BE MET IN 7 DAYS (6/26/2018):   1. PT WILL BE SPV FOR BED MOB SUP<>SIT.   2. PT WILL BE SPV FOR TF SIT<>STAND WITH RW X 5 TRAILS.   3. PT WILL BE SPA FOR GT WITH RW NWB ON LLE ON SMOOTH LEVEL SURFACES '.  4. PT WILL DEMONSTRATE UNDERSTANDING OF PROPER USE OF AD.                     History:     Past Medical History:   Diagnosis Date    Acid reflux     Arthritis     Hiatal hernia        Past Surgical History:   Procedure Laterality Date    CARPAL TUNNEL RELEASE Right     CHOLECYSTECTOMY      COLONOSCOPY      ESOPHAGEAL DILATION      multiple times    HYSTERECTOMY      OPEN REDUCTION AND INTERNAL FIXATION (ORIF) OF INJURY OF FOOT Left 6/18/2018    Procedure: ORIF, FOOT;  Surgeon: Jim Maria,  MD;  Location: [facility];  Service: Orthopedics;  Laterality: Left;  Calcaneous    TONSILLECTOMY         Clinical Decision Making:     History  Co-morbidities and personal factors that may impact the plan of care Examination  Body Structures and Functions, activity limitations and participation restrictions that may impact the plan of care Clinical Presentation   Decision Making/ Complexity Score   Co-morbidities:   [] Time since onset of injury / illness / exacerbation  [] Status of current condition  []Patient's cognitive status and safety concerns    [] Multiple Medical Problems (see med hx)  Personal Factors:   [] Patient's age  [] Prior Level of function   [] Patient's home situation (environment and family support)  [] Patient's level of motivation  [] Expected progression of patient      HISTORY:(criteria)    [] 93926 - no personal factors/history    [] 31253 - has 1-2 personal factor/comorbidity     [] 15972 - has >3 personal factor/comorbidity     Body Regions:  [] Objective examination findings  [] Head     []  Neck  [] Trunk   [] Upper Extremity  [] Lower Extremity    Body Systems:  [] For communication ability, affect, cognition, language, and learning style: the assessment of the ability to make needs known, consciousness, orientation (person, place, and time), expected emotional /behavioral responses, and learning preferences (eg, learning barriers, education  needs)  [] For the neuromuscular system: a general assessment of gross coordinated movement (eg, balance, gait, locomotion, transfers, and transitions) and motor function  (motor control and motor learning)  [] For the musculoskeletal system: the assessment of gross symmetry, gross range of motion, gross strength, height, and weight  [] For the integumentary system: the assessment of pliability(texture), presence of scar formation, skin color, and skin integrity  [] For cardiovascular/pulmonary system: the assessment of heart rate, respiratory  rate, blood pressure, and edema     Activity limitations:    [] Patient's cognitive status and saf ety concerns          [] Status of current condition      [] Weight bearing restriction  [] Cardiopulmunary Restriction    Participation Restrictions:   [] Goals and goal agreement with the patient     [] Rehab potential (prognosis) and probable outcome      Examination of Body System: (criteria)    [] 96317 - addressing 1-2 elements    [] 04449 - addressing a total of 3 or more elements     [] 78931 -  Addressing a total of 4 or more elements         Clinical Presentation: (criteria)  Choose one     On examination of body system using standardized tests and measures patient presents with (CHOOSE ONE) elements from any of the following: body structures and functions, activity limitations, and/or participation restrictions.  Leading to a clinical presentation that is considered (CHOOSE ONE)                              Clinical Decision Making  (Eval Complexity):  Choose One     Time Tracking:     PT Received On: 06/19/18  PT Start Time: 0800     PT Stop Time: 0825  PT Total Time (min): 25 min     Billable Minutes: Evaluation 15 and Gait Training 10     PT WAS ADVISED TO CALL FOR ASSISTANCE WITH ALL NEEDS. PT WAS AGREEABLE.     Yoel Han, SPT  06/19/2018

## 2018-06-19 NOTE — NURSING
Patient received from PACU via stretcher. Bedside report received from JACKSON Wilkinson. Patient oriented to room, fall precautions, call light, hourly rounding and room service. Bed low and locked, alarm on. Please see flow sheet for further interventions.

## 2018-06-19 NOTE — NURSING
Discharge instructions discussed with patient and spouse.  Pt. And spouse verbalize understanding.  IV DC'd.  Hemostasis achieved.  Pt. Will be transported to the exit via wheelchair by nurse and driven home by .  PO pain medication and nausea medication delivered to the room by [facility].

## 2018-06-20 NOTE — DISCHARGE SUMMARY
[facility] -   Discharge Summary      Admit Date: 6/18/2018    Discharge Date and Time: 6/19/2018  4:13 PM    Attending Physician: No att. providers found     Reason for Admission: ORIF of left calcaneal fracture    Procedures Performed: Procedure(s) (LRB):  ORIF, FOOT (Left)    Hospital Course (synopsis of major diagnoses, care, treatment, and services provided during the course of the hospital stay):The patient was scheduled for an ORIF of a left calcaneus fracture. She was admitted for pain control under extended observation and discharged to home the next evening.     Consults: PT    Significant Diagnostic Studies: Radiology: X-Ray: I reviewed her x-rays and agree with the findings.    Final Diagnoses:    Principal Problem: Calcaneal fracture   Secondary Diagnoses:   Active Hospital Problems    Diagnosis  POA    *Calcaneal fracture [S92.009A]  Yes     Chronic    Calcaneus fracture, left [S92.002A]  Yes     Chronic      Resolved Hospital Problems    Diagnosis Date Resolved POA   No resolved problems to display.       Discharged Condition: fair    Disposition: Home or Self Care    Follow Up/Patient Instructions:     Medications:  Reconciled Home Medications:      Medication List      START taking these medications    aspirin 81 MG EC tablet  Commonly known as:  ECOTRIN  Take 1 tablet (81 mg total) by mouth once daily.     docusate sodium 100 MG capsule  Commonly known as:  COLACE  Take 1 capsule (100 mg total) by mouth 2 (two) times daily.     ondansetron 4 MG Tbdl  Commonly known as:  ZOFRAN-ODT  Take 2 tablets (8 mg total) by mouth every 8 (eight) hours as needed.     oxyCODONE-acetaminophen 5-325 mg per tablet  Commonly known as:  PERCOCET  Take 1-2 tablets every 4-8 hrs as needed for pain.        CONTINUE taking these medications    COMPOUND HORMONE REPLACEMENT  Take 1 tablet by mouth once daily.     multivitamin capsule  Take 1 capsule by mouth once daily.     UNKNOWN TO PATIENT       "      Discharge Procedure Orders  WALKER FOR HOME USE   Order Specific Question Answer Comments   Type of Walker: Adult (5'4"-6'6")    With wheels? Yes    Height: 4' 11" (1.499 m)    Weight: 60.8 kg (134 lb)    Length of need (1-99 months): 3    Please check all that apply: Patient's condition impairs ambulation.    Please check all that apply: Patient is unable to safely ambulate without equipment.      Diet general     Diet general     Call MD for:  temperature >100.4     Call MD for:  persistent nausea and vomiting     Call MD for:  severe uncontrolled pain     Call MD for:  difficulty breathing, headache or visual disturbances     Call MD for:  redness, tenderness, or signs of infection (pain, swelling, redness, odor or green/yellow discharge around incision site)     Call MD for:  hives     Ice to affected area     Keep surgical extremity elevated     Non weight bearing     Leave dressing on - Keep it clean, dry, and intact until clinic visit     Call MD for:  temperature >100.4     Call MD for:  persistent nausea and vomiting     Call MD for:  severe uncontrolled pain     Call MD for:  difficulty breathing, headache or visual disturbances     Call MD for:  redness, tenderness, or signs of infection (pain, swelling, redness, odor or green/yellow discharge around incision site)     Call MD for:  hives     Keep surgical extremity elevated     Ice to affected area     Non weight bearing     Leave dressing on - Keep it clean, dry, and intact until clinic visit       Follow-up Information     Jim Maria MD. Schedule an appointment as soon as possible for a visit in 1 week.    Specialty:  Orthopedic Surgery  Why:  For wound re-check  Contact information:  [address] [place] [address] Dr Eliu STOLL [id number] [phone]                 "

## 2018-06-26 DIAGNOSIS — R52 PAIN: Primary | ICD-10-CM

## 2018-06-27 ENCOUNTER — HOSPITAL ENCOUNTER (OUTPATIENT)
Dept: RADIOLOGY | Facility: HOSPITAL | Age: 73
Discharge: HOME | End: 2018-06-27
Attending: ORTHOPAEDIC SURGERY
Payer: MEDICARE

## 2018-06-27 ENCOUNTER — OFFICE VISIT (OUTPATIENT)
Dept: ORTHOPEDICS | Facility: CLINIC | Age: 73
End: 2018-06-27
Payer: MEDICARE

## 2018-06-27 VITALS
SYSTOLIC BLOOD PRESSURE: 114 MMHG | HEART RATE: 59 BPM | BODY MASS INDEX: 27.33 KG/M2 | WEIGHT: 135.56 LBS | DIASTOLIC BLOOD PRESSURE: 73 MMHG | HEIGHT: 59 IN

## 2018-06-27 DIAGNOSIS — R52 PAIN: ICD-10-CM

## 2018-06-27 DIAGNOSIS — S92.042D CLOSED DISPLACED FRACTURE OF TUBEROSITY OF LEFT CALCANEUS WITH ROUTINE HEALING, UNSPECIFIED FRACTURE MORPHOLOGY, SUBSEQUENT ENCOUNTER: Primary | ICD-10-CM

## 2018-06-27 PROCEDURE — 73650 X-RAY EXAM OF HEEL: CPT | Mod: 26,LT,, | Performed by: RADIOLOGY

## 2018-06-27 PROCEDURE — 99999 PR PBB SHADOW E&M-EST. PATIENT-LVL III: CPT | Mod: PBBFAC,,, | Performed by: ORTHOPAEDIC SURGERY

## 2018-06-27 PROCEDURE — 73650 X-RAY EXAM OF HEEL: CPT | Mod: TC,LT

## 2018-06-27 PROCEDURE — 99024 POSTOP FOLLOW-UP VISIT: CPT | Mod: S$GLB,,, | Performed by: ORTHOPAEDIC SURGERY

## 2018-06-27 RX ORDER — MAGNESIUM 250 MG
TABLET ORAL
COMMUNITY

## 2018-06-27 RX ORDER — PANTOPRAZOLE SODIUM 40 MG/1
40 TABLET, DELAYED RELEASE ORAL
COMMUNITY

## 2018-06-27 RX ORDER — IBUPROFEN 100 MG/5ML
1000 SUSPENSION, ORAL (FINAL DOSE FORM) ORAL
COMMUNITY

## 2018-06-27 RX ORDER — ESCITALOPRAM OXALATE 20 MG/1
TABLET ORAL
COMMUNITY
Start: 2017-10-02

## 2018-06-27 RX ORDER — TRAMADOL HYDROCHLORIDE 50 MG/1
50 TABLET ORAL EVERY 6 HOURS PRN
Qty: 30 TABLET | Refills: 0 | Status: SHIPPED | OUTPATIENT
Start: 2018-06-27 | End: 2018-07-07

## 2018-06-27 RX ORDER — FUROSEMIDE 40 MG/1
40 TABLET ORAL
COMMUNITY
Start: 2017-08-31

## 2018-06-27 NOTE — PROGRESS NOTES
Subjective:     Patient ID: Nayana Bledsoe is a 73 y.o. female.    Chief Complaint: Pain of the Left Foot    She is approximately 10 days out form left calcaneus ORIF. Doing well. No new injuries. States she has been NWB      Foot Pain    The pain is present in the left foot. This is a new problem. The current episode started 1 to 4 weeks ago. Movement associated with injury: falling off ladder.The problem occurs intermittently. Quality: pressure and discomfort. The pain is at a severity of 2/10. Pertinent negatives include no fever or itching. She has tried oral narcotics for the symptoms.       Past Medical History:   Diagnosis Date    Acid reflux     Arthritis     Hiatal hernia      Past Surgical History:   Procedure Laterality Date    CARPAL TUNNEL RELEASE Right     CHOLECYSTECTOMY      COLONOSCOPY      ESOPHAGEAL DILATION      multiple times    HYSTERECTOMY      OPEN REDUCTION AND INTERNAL FIXATION (ORIF) OF INJURY OF FOOT Left 6/18/2018    Procedure: ORIF, FOOT;  Surgeon: Jim Maria MD;  Location: [facility];  Service: Orthopedics;  Laterality: Left;  Calcaneous    TONSILLECTOMY       No family history on file.  Social History     Social History    Marital status:      Spouse name: N/A    Number of children: N/A    Years of education: N/A     Occupational History    Not on file.     Social History Main Topics    Smoking status: Never Smoker    Smokeless tobacco: Never Used    Alcohol use Yes      Comment: on weekends    Drug use: No    Sexual activity: Not on file     Other Topics Concern    Not on file     Social History Narrative    No narrative on file     Medication List with Changes/Refills   Current Medications    ASCORBIC ACID, VITAMIN C, (VITAMIN C) 1000 MG TABLET    Take 1,000 mg by mouth.    ASPIRIN (ECOTRIN) 81 MG EC TABLET    Take 1 tablet (81 mg total) by mouth once daily.    COMPOUND HORMONE REPLACEMENT    Take 1 tablet by mouth once daily.    ESCITALOPRAM  "OXALATE (LEXAPRO) 20 MG TABLET    Take one and one half tablet daily    FUROSEMIDE (LASIX) 40 MG TABLET    Take 40 mg by mouth.    MAGNESIUM 250 MG TAB    Take by mouth.    MULTIVITAMIN CAPSULE    Take 1 capsule by mouth once daily.    ONDANSETRON (ZOFRAN-ODT) 4 MG TBDL    Take 2 tablets (8 mg total) by mouth every 8 (eight) hours as needed.    OXYCODONE-ACETAMINOPHEN (PERCOCET) 5-325 MG PER TABLET    Take 1-2 tablets every 4-8 hrs as needed for pain.    PANTOPRAZOLE (PROTONIX) 40 MG TABLET    Take 40 mg by mouth.    UNKNOWN TO PATIENT         Review of patient's allergies indicates:   Allergen Reactions    Codeine      "i dont like the funny feeling it gives me"     Review of Systems   Constitution: Negative for fever.   HENT: Negative for sore throat.    Eyes: Negative for blurred vision.   Cardiovascular: Negative for dyspnea on exertion.   Respiratory: Negative for shortness of breath.    Hematologic/Lymphatic: Does not bruise/bleed easily.   Skin: Negative for itching.   Musculoskeletal: Positive for joint pain.   Gastrointestinal: Negative for vomiting.   Genitourinary: Negative for dysuria.   Neurological: Negative for dizziness.   Psychiatric/Behavioral: The patient does not have insomnia.        Objective:   Body mass index is 27.38 kg/m².  Vitals:    06/27/18 0958   BP: 114/73   Pulse: (!) 59                   Left Ankle/Foot Exam     Comments:  Incicision c/d/i no signs of infection. Scant bruising to wound edges.       EXAMINATION:  XR CALCANEUS 2 VIEW LEFT    CLINICAL HISTORY:  Pain, unspecified    TECHNIQUE:  Tangential and lateral views of the left calcaneus were performed.    COMPARISON:  06/18/2018    FINDINGS:  2 screws are again seen within the posterior calcaneus. Major fracture fragments are in near anatomic alignment. No hardware complication is seen.  No new abnormality.   Impression       As above      Electronically signed by: Jeovanny Andrade MD  Date: 06/27/2018  Time: 10:04 "         Assessment:     Encounter Diagnosis   Name Primary?    Closed displaced fracture of tuberosity of left calcaneus with routine healing, unspecified fracture morphology, subsequent encounter Yes        Plan:       NWB LLE  Transition to boot  Wait 1 week for sutures out

## 2018-06-29 ENCOUNTER — HOSPITAL ENCOUNTER (EMERGENCY)
Facility: HOSPITAL | Age: 73
Discharge: HOME | End: 2018-06-29
Attending: EMERGENCY MEDICINE
Payer: MEDICARE

## 2018-06-29 ENCOUNTER — TELEPHONE (OUTPATIENT)
Dept: ORTHOPEDICS | Facility: CLINIC | Age: 73
End: 2018-06-29

## 2018-06-29 VITALS
WEIGHT: 135 LBS | HEART RATE: 67 BPM | RESPIRATION RATE: 16 BRPM | OXYGEN SATURATION: 97 % | DIASTOLIC BLOOD PRESSURE: 64 MMHG | SYSTOLIC BLOOD PRESSURE: 126 MMHG | TEMPERATURE: 99 F | BODY MASS INDEX: 27.27 KG/M2

## 2018-06-29 DIAGNOSIS — R60.9 SWELLING: ICD-10-CM

## 2018-06-29 DIAGNOSIS — M79.605 PAIN OF LEFT LOWER EXTREMITY: ICD-10-CM

## 2018-06-29 DIAGNOSIS — G89.18 POST-OP PAIN: Primary | ICD-10-CM

## 2018-06-29 LAB
ALBUMIN SERPL BCP-MCNC: 3.6 G/DL
ALP SERPL-CCNC: 35 U/L
ALT SERPL W/O P-5'-P-CCNC: 22 U/L
ANION GAP SERPL CALC-SCNC: 10 MMOL/L
AST SERPL-CCNC: 21 U/L
BASOPHILS # BLD AUTO: 0.02 K/UL
BASOPHILS NFR BLD: 0.3 %
BILIRUB SERPL-MCNC: 0.6 MG/DL
BUN SERPL-MCNC: 14 MG/DL
CALCIUM SERPL-MCNC: 9.6 MG/DL
CHLORIDE SERPL-SCNC: 102 MMOL/L
CO2 SERPL-SCNC: 28 MMOL/L
CREAT SERPL-MCNC: 0.9 MG/DL
DIFFERENTIAL METHOD: ABNORMAL
EOSINOPHIL # BLD AUTO: 0.2 K/UL
EOSINOPHIL NFR BLD: 3.1 %
ERYTHROCYTE [DISTWIDTH] IN BLOOD BY AUTOMATED COUNT: 12.7 %
EST. GFR  (AFRICAN AMERICAN): >60 ML/MIN/1.73 M^2
EST. GFR  (NON AFRICAN AMERICAN): >60 ML/MIN/1.73 M^2
GLUCOSE SERPL-MCNC: 81 MG/DL
HCT VFR BLD AUTO: 42.4 %
HGB BLD-MCNC: 15.1 G/DL
LYMPHOCYTES # BLD AUTO: 2.3 K/UL
LYMPHOCYTES NFR BLD: 29.3 %
MCH RBC QN AUTO: 33.7 PG
MCHC RBC AUTO-ENTMCNC: 35.6 G/DL
MCV RBC AUTO: 95 FL
MONOCYTES # BLD AUTO: 0.8 K/UL
MONOCYTES NFR BLD: 10.4 %
NEUTROPHILS # BLD AUTO: 4.4 K/UL
NEUTROPHILS NFR BLD: 56.9 %
PLATELET # BLD AUTO: 272 K/UL
PMV BLD AUTO: 9.8 FL
POTASSIUM SERPL-SCNC: 4.1 MMOL/L
PROT SERPL-MCNC: 7.2 G/DL
RBC # BLD AUTO: 4.48 M/UL
SODIUM SERPL-SCNC: 140 MMOL/L
WBC # BLD AUTO: 7.67 K/UL

## 2018-06-29 PROCEDURE — 25500020 PHARM REV CODE 255: Performed by: EMERGENCY MEDICINE

## 2018-06-29 PROCEDURE — 85025 COMPLETE CBC W/AUTO DIFF WBC: CPT

## 2018-06-29 PROCEDURE — 80053 COMPREHEN METABOLIC PANEL: CPT

## 2018-06-29 PROCEDURE — 96360 HYDRATION IV INFUSION INIT: CPT | Mod: 59

## 2018-06-29 PROCEDURE — 99284 EMERGENCY DEPT VISIT MOD MDM: CPT | Mod: 25

## 2018-06-29 PROCEDURE — 25000003 PHARM REV CODE 250: Performed by: EMERGENCY MEDICINE

## 2018-06-29 RX ADMIN — IOHEXOL 100 ML: 350 INJECTION, SOLUTION INTRAVENOUS at 11:06

## 2018-06-29 RX ADMIN — SODIUM CHLORIDE 1000 ML: 0.9 INJECTION, SOLUTION INTRAVENOUS at 10:06

## 2018-06-29 NOTE — ED NOTES
In bed resting, VSS,aaox3,skin warm dry to touch,equal bilateral clear lung sounds,side rails up x 2,bed locked and in low position, plan of care discussed, oriented to surroundings, instructed to call with any needs. Patient denies any complaints at this time

## 2018-06-29 NOTE — ED PROVIDER NOTES
"            SCRIBE #1 NOTE: I, Nella David, am scribing for, and in the presence of, Suhas Nieto Jr., MD. I have scribed the entire note.      History      Chief Complaint   Patient presents with    Post-op Problem     heel fracture surgery 11 days ago; foot and leg turn purple when using walker; Dr. Maria told her to come to ED for eval        Review of patient's allergies indicates:   Allergen Reactions    Codeine      "i dont like the funny feeling it gives me"        HPI   HPI    6/29/2018, 10:06 AM   History obtained from the patient      History of Present Illness: Nayana Bledsoe is a 73 y.o. female patient who presents to the Emergency Department s/p open reduction of L calcaneus Fx for further evaluation. Pt reports her LLE turns purple/blue when she ambulates. She is wearing a walking boot currently and ambulates with a walker. She does not have orders to bear weight yet. Sxs are episodic and moderate in severity. Mitigated by elevating LLE. Associated sxs include SOB with mild exertion. She denies any fever, chills, CP, n/v, extremity weakness/numbness, calf pain, leg swelling, dizziness, syncope, palpitations, erythema, drainage, and all other sxs. No further complaints or concerns.       Arrival mode: Personal vehicle      PCP: Jonatan Joyce Iii, MD       Past Medical History:  Past Medical History:   Diagnosis Date    Acid reflux     Arthritis     Hiatal hernia        Past Surgical History:  Past Surgical History:   Procedure Laterality Date    CARPAL TUNNEL RELEASE Right     CHOLECYSTECTOMY      COLONOSCOPY      ESOPHAGEAL DILATION      multiple times    HYSTERECTOMY      OPEN REDUCTION AND INTERNAL FIXATION (ORIF) OF INJURY OF FOOT Left 6/18/2018    Procedure: ORIF, FOOT;  Surgeon: Jim Maria MD;  Location: [facility];  Service: Orthopedics;  Laterality: Left;  Calcaneous    TONSILLECTOMY           Family History:  History reviewed. No pertinent family history.    Social " History:  Social History     Social History Main Topics    Smoking status: Never Smoker    Smokeless tobacco: Never Used    Alcohol use Yes      Comment: on weekends    Drug use: No    Sexual activity: unknown       ROS   Review of Systems   Constitutional: Negative for chills and fever.   HENT: Negative for sore throat.    Respiratory: Negative for shortness of breath.    Cardiovascular: Negative for chest pain, palpitations and leg swelling.   Gastrointestinal: Negative for nausea and vomiting.   Genitourinary: Negative for dysuria.   Musculoskeletal: Negative for back pain and myalgias.   Skin: Positive for color change (LLE).        (-) erythema  (-) drainage   Neurological: Negative for dizziness, syncope, weakness and numbness.   Hematological: Does not bruise/bleed easily.   All other systems reviewed and are negative.    Physical Exam      Initial Vitals [06/29/18 1002]   BP Pulse Resp Temp SpO2   127/71 61 16 98 °F (36.7 °C) 95 %      MAP       --          Physical Exam  Nursing Notes and Vital Signs Reviewed.  Constitutional: Patient is in no acute distress. Well-developed and well-nourished.  Head: Atraumatic. Normocephalic.  Eyes: PERRL. EOM intact. Conjunctivae are not pale. No scleral icterus.  ENT: Mucous membranes are moist. Oropharynx is clear and symmetric.    Neck: Supple. Full ROM. No lymphadenopathy.  Cardiovascular: Regular rate. Regular rhythm. No murmurs, rubs, or gallops. Distal pulses are 2+ and symmetric.  Pulmonary/Chest: No respiratory distress. Clear to auscultation bilaterally. No wheezing or rales.  Abdominal: Soft and non-distended.  There is no tenderness.    Musculoskeletal: Moves all extremities. No obvious deformities. No edema. No calf tenderness.  LLE: no evident deformity. Negative for swelling. Negative for tenderness. ROM is normal. Cap refill distally is <2 seconds. DP and PT pulses are equal and 2+ bilaterally. No motor deficit. No distal sensory deficit.  Large  incisional wound extending over posterior aspect of calcaneous and laterally under foot on the heel. Mild amount of erythema surrounding sutures, otherwise no overt evidence of infection. No drainage, no fluctuance, no increased warmth.   Skin: Warm and dry.  Neurological:  Alert, awake, and appropriate.  Normal speech.  No acute focal neurological deficits are appreciated.  Psychiatric: Normal affect. Good eye contact. Appropriate in content.    ED Course    Procedures  ED Vital Signs:  Vitals:    06/29/18 1002 06/29/18 1131   BP: 127/71 133/62   Pulse: 61 (!) 56   Resp: 16    Temp: 98 °F (36.7 °C)    TempSrc: Oral    SpO2: 95% 96%   Weight: 61.2 kg (135 lb)        Abnormal Lab Results:  Labs Reviewed   CBC W/ AUTO DIFFERENTIAL - Abnormal; Notable for the following:        Result Value    MCH 33.7 (*)     All other components within normal limits   COMPREHENSIVE METABOLIC PANEL - Abnormal; Notable for the following:     Alkaline Phosphatase 35 (*)     All other components within normal limits        All Lab Results:  Results for orders placed or performed during the hospital encounter of 06/29/18   CBC auto differential   Result Value Ref Range    WBC 7.67 3.90 - 12.70 K/uL    RBC 4.48 4.00 - 5.40 M/uL    Hemoglobin 15.1 12.0 - 16.0 g/dL    Hematocrit 42.4 37.0 - 48.5 %    MCV 95 82 - 98 fL    MCH 33.7 (H) 27.0 - 31.0 pg    MCHC 35.6 32.0 - 36.0 g/dL    RDW 12.7 11.5 - 14.5 %    Platelets 272 150 - 350 K/uL    MPV 9.8 9.2 - 12.9 fL    Gran # (ANC) 4.4 1.8 - 7.7 K/uL    Lymph # 2.3 1.0 - 4.8 K/uL    Mono # 0.8 0.3 - 1.0 K/uL    Eos # 0.2 0.0 - 0.5 K/uL    Baso # 0.02 0.00 - 0.20 K/uL    Gran% 56.9 38.0 - 73.0 %    Lymph% 29.3 18.0 - 48.0 %    Mono% 10.4 4.0 - 15.0 %    Eosinophil% 3.1 0.0 - 8.0 %    Basophil% 0.3 0.0 - 1.9 %    Differential Method Automated    Comprehensive metabolic panel   Result Value Ref Range    Sodium 140 136 - 145 mmol/L    Potassium 4.1 3.5 - 5.1 mmol/L    Chloride 102 95 - 110 mmol/L    CO2  28 23 - 29 mmol/L    Glucose 81 70 - 110 mg/dL    BUN, Bld 14 8 - 23 mg/dL    Creatinine 0.9 0.5 - 1.4 mg/dL    Calcium 9.6 8.7 - 10.5 mg/dL    Total Protein 7.2 6.0 - 8.4 g/dL    Albumin 3.6 3.5 - 5.2 g/dL    Total Bilirubin 0.6 0.1 - 1.0 mg/dL    Alkaline Phosphatase 35 (L) 55 - 135 U/L    AST 21 10 - 40 U/L    ALT 22 10 - 44 U/L    Anion Gap 10 8 - 16 mmol/L    eGFR if African American >60 >60 mL/min/1.73 m^2    eGFR if non African American >60 >60 mL/min/1.73 m^2       Imaging Results:  Imaging Results          CTA Chest Non-Coronary (PE Study) (Final result)  Result time 06/29/18 12:11:27    Final result by AKI Parada Sr., MD (06/29/18 12:11:27)                 Impression:      1. There is no pulmonary embolism.  2. There is a subtle patchy area of increased density in the lateral aspect of the base of the left lower lobe.  This is characteristic of atelectasis or subtle pneumonia.  3. There is a large hiatal hernia.  4. There are mild degenerative changes in the spine.  All CT scans at this facility use dose modulation, iterative reconstruction, and/or weight base dosing when appropriate to reduce radiation dose when appropriate to reduce radiation dose to as low as reasonably achievable.      Electronically signed by: Ramez Parada MD  Date:    06/29/2018  Time:    12:11             Narrative:    EXAMINATION:  CTA CHEST NON CORONARY    CLINICAL HISTORY:  Chest pain, acute, PE suspected, high pretest prob;    TECHNIQUE:  Standard chest CT protocol was performed with IV contrast and 3D MIP reformats.  100 mL of Omnipaque 350 contrast material was used for this examination.    COMPARISON:  A chest x-ray performed on 06/18/2018.    FINDINGS:  The size of heart is within normal limits. The thoracic aorta is normal in appearance. There is no pulmonary embolism.  There is a subtle patchy area of increased density in the lateral aspect of the base of the left lower lobe.  There are several intrapulmonary  lymph nodes along the major fissure of the left lung.  The right lung is clear.  There is no pneumothorax or pleural effusion.  There is a large hiatal hernia.  There are mild degenerative changes in the spine.                               US Lower Extremity Veins Left (Final result)  Result time 06/29/18 11:37:23    Final result by AKI Parada Sr., MD (06/29/18 11:37:23)                 Impression:      1. No deep venous thrombosis  2. A superficial vein in the region of the left calf is occluded.      Electronically signed by: Ramez Parada MD  Date:    06/29/2018  Time:    11:37             Narrative:    EXAMINATION:  US LOWER EXTREMITY VEINS LEFT    CLINICAL HISTORY:  Edema, unspecified    TECHNIQUE:  Multiple static images are submitted for interpretation with color flow and spectral Doppler imaging.    COMPARISON:  None    FINDINGS:  The veins in the left lower extremity are normal in appearance and have normal compressibility. There are normal venous waveforms seen with augmentation during the compression of the veins. The left common femoral vein has a velocity of 5 cm/sec.  A superficial vein in the region of the left calf is occluded.                                        The Emergency Provider reviewed the vital signs and test results, which are outlined above.    ED Discussion     12:33 PM: Discussed pt's case with Dr. Maria (Orthopedics) who recommends having pt f/u in clinic.    12:58 PM: Reassessed pt at this time. Pt has an appointment with Dr. Maria in 1 week, she is to f/u as scheduled. Discussed with pt all pertinent ED information and results. Discussed pt dx and plan of tx. Gave pt all f/u and return to the ED instructions. All questions and concerns were addressed at this time. Pt expresses understanding of information and instructions, and is comfortable with plan to discharge. Pt is stable for discharge.    I discussed with patient and/or family/caretaker that evaluation in the  ED does not suggest any emergent or life threatening medical conditions requiring immediate intervention beyond what was provided in the ED, and I believe patient is safe for discharge.  Regardless, an unremarkable evaluation in the ED does not preclude the development or presence of a serious of life threatening condition. As such, patient was instructed to return immediately for any worsening or change in current symptoms.      ED Medication(s):  Medications   sodium chloride 0.9% bolus 1,000 mL (0 mLs Intravenous Stopped 6/29/18 1200)   omnipaque 350 iohexol 100 mL (100 mLs Intravenous Given 6/29/18 1153)       New Prescriptions    No medications on file       Follow-up Information     Jim Maria MD. Call in 2 days.    Specialty:  Orthopedic Surgery  Contact information:  [address] [place] [address] Dr Eliu STOLL [id number] [phone]                     Medical Decision Making    Medical Decision Making:   Clinical Tests:   Lab Tests: Ordered and Reviewed  Radiological Study: Ordered and Reviewed           Scribe Attestation:   Scribe #1: I performed the above scribed service and the documentation accurately describes the services I performed. I attest to the accuracy of the note.    Attending:   Physician Attestation Statement for Scribe #1: I, Suhas Nieto Jr., MD, personally performed the services described in this documentation, as scribed by Nella Hernandez, in my presence, and it is both accurate and complete.          Clinical Impression       ICD-10-CM ICD-9-CM   1. Post-op pain G89.18 338.18   2. Swelling R60.9 782.3   3. Pain of left lower extremity M79.605 729.5       Disposition:   Disposition: Discharged  Condition: Stable         Suhas Nieto Jr., MD  06/29/18 8190

## 2018-06-29 NOTE — TELEPHONE ENCOUNTER
Called pt to advise that Dr. Maria would like her to go yo the ED to have her foot checked. Pt vocalized understanding.

## 2018-07-06 ENCOUNTER — OFFICE VISIT (OUTPATIENT)
Dept: ORTHOPEDICS | Facility: CLINIC | Age: 73
End: 2018-07-06
Payer: MEDICARE

## 2018-07-06 VITALS
TEMPERATURE: 98 F | BODY MASS INDEX: 27.21 KG/M2 | DIASTOLIC BLOOD PRESSURE: 70 MMHG | SYSTOLIC BLOOD PRESSURE: 112 MMHG | HEART RATE: 59 BPM | WEIGHT: 135 LBS | HEIGHT: 59 IN

## 2018-07-06 DIAGNOSIS — R52 PAIN: Primary | ICD-10-CM

## 2018-07-06 DIAGNOSIS — S92.002D CLOSED DISPLACED FRACTURE OF LEFT CALCANEUS WITH ROUTINE HEALING, SUBSEQUENT ENCOUNTER: Primary | ICD-10-CM

## 2018-07-06 PROCEDURE — 99024 POSTOP FOLLOW-UP VISIT: CPT | Mod: S$GLB,,, | Performed by: ORTHOPAEDIC SURGERY

## 2018-07-06 PROCEDURE — 99999 PR PBB SHADOW E&M-EST. PATIENT-LVL III: CPT | Mod: PBBFAC,,, | Performed by: ORTHOPAEDIC SURGERY

## 2018-07-06 NOTE — PROGRESS NOTES
Subjective:     Patient ID: Nayana Bledsoe is a 73 y.o. female.    Chief Complaint: Pain of the Left Foot    She is 2 - 3 weeks out from tongue type calcaneus fracture ORIF. Has been NWB. No drainage. No fever or chills. No accidents or falls.      Foot Pain    The pain is present in the left foot. This is a new problem. The current episode started 1 to 4 weeks ago. Movement associated with injury: falling off ladder.The problem occurs intermittently. Quality: pressure and discomfort. The pain is at a severity of 0/10. Pertinent negatives include no fever or itching. She has tried oral narcotics for the symptoms.       Past Medical History:   Diagnosis Date    Acid reflux     Arthritis     Hiatal hernia      Past Surgical History:   Procedure Laterality Date    CARPAL TUNNEL RELEASE Right     CHOLECYSTECTOMY      COLONOSCOPY      ESOPHAGEAL DILATION      multiple times    HYSTERECTOMY      OPEN REDUCTION AND INTERNAL FIXATION (ORIF) OF INJURY OF FOOT Left 6/18/2018    Procedure: ORIF, FOOT;  Surgeon: Jim Maria MD;  Location: [facility];  Service: Orthopedics;  Laterality: Left;  Calcaneous    TONSILLECTOMY       History reviewed. No pertinent family history.  Social History     Social History    Marital status:      Spouse name: N/A    Number of children: N/A    Years of education: N/A     Occupational History    Not on file.     Social History Main Topics    Smoking status: Never Smoker    Smokeless tobacco: Never Used    Alcohol use Yes      Comment: on weekends    Drug use: No    Sexual activity: Not on file     Other Topics Concern    Not on file     Social History Narrative    No narrative on file     Medication List with Changes/Refills   Current Medications    ASCORBIC ACID, VITAMIN C, (VITAMIN C) 1000 MG TABLET    Take 1,000 mg by mouth.    ASPIRIN (ECOTRIN) 81 MG EC TABLET    Take 1 tablet (81 mg total) by mouth once daily.    COMPOUND HORMONE REPLACEMENT    Take 1  "tablet by mouth once daily.    ESCITALOPRAM OXALATE (LEXAPRO) 20 MG TABLET    Take one and one half tablet daily    FUROSEMIDE (LASIX) 40 MG TABLET    Take 40 mg by mouth.    MAGNESIUM 250 MG TAB    Take by mouth.    MULTIVITAMIN CAPSULE    Take 1 capsule by mouth once daily.    ONDANSETRON (ZOFRAN-ODT) 4 MG TBDL    Take 2 tablets (8 mg total) by mouth every 8 (eight) hours as needed.    OXYCODONE-ACETAMINOPHEN (PERCOCET) 5-325 MG PER TABLET    Take 1-2 tablets every 4-8 hrs as needed for pain.    PANTOPRAZOLE (PROTONIX) 40 MG TABLET    Take 40 mg by mouth.    TRAMADOL (ULTRAM) 50 MG TABLET    Take 1 tablet (50 mg total) by mouth every 6 (six) hours as needed for Pain.    UNKNOWN TO PATIENT         Review of patient's allergies indicates:   Allergen Reactions    Codeine      "i dont like the funny feeling it gives me"     Review of Systems   Constitution: Negative for fever.   HENT: Negative for sore throat.    Eyes: Negative for blurred vision.   Cardiovascular: Negative for dyspnea on exertion.   Respiratory: Negative for shortness of breath.    Hematologic/Lymphatic: Does not bruise/bleed easily.   Skin: Negative for itching.   Musculoskeletal: Positive for joint pain.   Gastrointestinal: Negative for vomiting.   Genitourinary: Negative for dysuria.   Neurological: Negative for dizziness.   Psychiatric/Behavioral: The patient does not have insomnia.        Objective:   Body mass index is 27.27 kg/m².  Vitals:    07/06/18 0717   BP: 112/70   Pulse: (!) 59   Temp: 97.9 °F (36.6 °C)           General    Nursing note and vitals reviewed.  Constitutional: She is oriented to person, place, and time. She appears well-developed. No distress.   HENT:   Head: Normocephalic and atraumatic.   Eyes: EOM are normal.   Cardiovascular: Normal rate.    Pulmonary/Chest: Effort normal. No stridor.   Neurological: She is alert and oriented to person, place, and time.   Psychiatric: She has a normal mood and affect. Her behavior is " normal.         Left Ankle/Foot Exam     Comments:  Skin intact left heel no signs infection.  Middle aspect of wound where skin was tenting from injury has scant edge early necrosis, no virginie dehiscence, this is superficial. Gentle ROM ankle without pain.             IMAGING no new radiographs    Assessment:     Encounter Diagnosis   Name Primary?    Closed displaced fracture of left calcaneus with routine healing, subsequent encounter Yes        Plan:     Non weight bearing  Okay gentle ROM ankle  In boot otherwise  Sutures removed  Steri strips applied  Watch wound healing  Follow up in 4 weeks with new xrays

## 2018-07-11 ENCOUNTER — HOSPITAL ENCOUNTER (OUTPATIENT)
Facility: HOSPITAL | Age: 73
Discharge: HOME | End: 2018-07-13
Attending: ORTHOPAEDIC SURGERY | Admitting: ORTHOPAEDIC SURGERY
Payer: MEDICARE

## 2018-07-11 DIAGNOSIS — S92.032B: Primary | Chronic | ICD-10-CM

## 2018-07-11 DIAGNOSIS — T81.89XA PROBLEM INVOLVING SURGICAL INCISION: ICD-10-CM

## 2018-07-11 DIAGNOSIS — T81.9XXA POSTOPERATIVE COMPLICATION: ICD-10-CM

## 2018-07-11 DIAGNOSIS — Z01.818 PREOP EXAMINATION: ICD-10-CM

## 2018-07-11 PROBLEM — E78.6 HDL LIPOPROTEIN DEFICIENCY: Status: ACTIVE | Noted: 2018-07-11

## 2018-07-11 PROBLEM — E78.1 HIGH TRIGLYCERIDES: Status: ACTIVE | Noted: 2018-07-11

## 2018-07-11 PROBLEM — R03.0 ELEVATED BP WITHOUT DIAGNOSIS OF HYPERTENSION: Status: RESOLVED | Noted: 2018-07-11 | Resolved: 2018-07-11

## 2018-07-11 PROBLEM — M19.041 PRIMARY OSTEOARTHRITIS OF BOTH HANDS: Status: ACTIVE | Noted: 2017-03-09

## 2018-07-11 PROBLEM — R03.0 ELEVATED BP WITHOUT DIAGNOSIS OF HYPERTENSION: Status: ACTIVE | Noted: 2018-07-11

## 2018-07-11 PROBLEM — M77.12 LATERAL EPICONDYLITIS OF LEFT ELBOW: Status: ACTIVE | Noted: 2017-03-09

## 2018-07-11 PROBLEM — M47.812 FACET ARTHROPATHY, CERVICAL: Status: ACTIVE | Noted: 2017-03-09

## 2018-07-11 PROBLEM — M25.549 ARTHRALGIA OF HAND: Status: ACTIVE | Noted: 2017-02-16

## 2018-07-11 PROBLEM — M17.0 PRIMARY OSTEOARTHRITIS OF BOTH KNEES: Status: ACTIVE | Noted: 2017-03-09

## 2018-07-11 PROBLEM — M19.042 PRIMARY OSTEOARTHRITIS OF BOTH HANDS: Status: ACTIVE | Noted: 2017-03-09

## 2018-07-11 PROBLEM — M86.9 OSTEOMYELITIS OF LEFT FOOT: Status: ACTIVE | Noted: 2018-07-11

## 2018-07-11 LAB
ANION GAP SERPL CALC-SCNC: 12 MMOL/L
BASOPHILS # BLD AUTO: 0.02 K/UL
BASOPHILS NFR BLD: 0.2 %
BUN SERPL-MCNC: 25 MG/DL
CALCIUM SERPL-MCNC: 9.2 MG/DL
CHLORIDE SERPL-SCNC: 105 MMOL/L
CO2 SERPL-SCNC: 22 MMOL/L
CREAT SERPL-MCNC: 1.2 MG/DL
DIFFERENTIAL METHOD: ABNORMAL
EOSINOPHIL # BLD AUTO: 0.1 K/UL
EOSINOPHIL NFR BLD: 1.1 %
ERYTHROCYTE [DISTWIDTH] IN BLOOD BY AUTOMATED COUNT: 13.2 %
EST. GFR  (AFRICAN AMERICAN): 53 ML/MIN/1.73 M^2
EST. GFR  (NON AFRICAN AMERICAN): 46 ML/MIN/1.73 M^2
GLUCOSE SERPL-MCNC: 89 MG/DL
HCT VFR BLD AUTO: 44.7 %
HGB BLD-MCNC: 16 G/DL
LYMPHOCYTES # BLD AUTO: 1.7 K/UL
LYMPHOCYTES NFR BLD: 15.4 %
MAGNESIUM SERPL-MCNC: 2.6 MG/DL
MCH RBC QN AUTO: 33.8 PG
MCHC RBC AUTO-ENTMCNC: 35.8 G/DL
MCV RBC AUTO: 94 FL
MONOCYTES # BLD AUTO: 0.9 K/UL
MONOCYTES NFR BLD: 7.8 %
NEUTROPHILS # BLD AUTO: 8.5 K/UL
NEUTROPHILS NFR BLD: 75.5 %
PHOSPHATE SERPL-MCNC: 3.1 MG/DL
PLATELET # BLD AUTO: 219 K/UL
PMV BLD AUTO: 10.5 FL
POTASSIUM SERPL-SCNC: 4.4 MMOL/L
RBC # BLD AUTO: 4.74 M/UL
SODIUM SERPL-SCNC: 139 MMOL/L
WBC # BLD AUTO: 11.18 K/UL

## 2018-07-11 PROCEDURE — S0028 INJECTION, FAMOTIDINE, 20 MG: HCPCS | Performed by: ORTHOPAEDIC SURGERY

## 2018-07-11 PROCEDURE — 85025 COMPLETE CBC W/AUTO DIFF WBC: CPT

## 2018-07-11 PROCEDURE — 96366 THER/PROPH/DIAG IV INF ADDON: CPT

## 2018-07-11 PROCEDURE — 93005 ELECTROCARDIOGRAM TRACING: CPT

## 2018-07-11 PROCEDURE — 96367 TX/PROPH/DG ADDL SEQ IV INF: CPT

## 2018-07-11 PROCEDURE — 96375 TX/PRO/DX INJ NEW DRUG ADDON: CPT

## 2018-07-11 PROCEDURE — 25000003 PHARM REV CODE 250: Performed by: ORTHOPAEDIC SURGERY

## 2018-07-11 PROCEDURE — 63600175 PHARM REV CODE 636 W HCPCS: Performed by: NURSE PRACTITIONER

## 2018-07-11 PROCEDURE — G0378 HOSPITAL OBSERVATION PER HR: HCPCS

## 2018-07-11 PROCEDURE — 96361 HYDRATE IV INFUSION ADD-ON: CPT

## 2018-07-11 PROCEDURE — 80048 BASIC METABOLIC PNL TOTAL CA: CPT

## 2018-07-11 PROCEDURE — 63600175 PHARM REV CODE 636 W HCPCS: Performed by: PHYSICIAN ASSISTANT

## 2018-07-11 PROCEDURE — 25000003 PHARM REV CODE 250: Performed by: PHYSICIAN ASSISTANT

## 2018-07-11 PROCEDURE — 99285 EMERGENCY DEPT VISIT HI MDM: CPT | Mod: 25

## 2018-07-11 PROCEDURE — 96365 THER/PROPH/DIAG IV INF INIT: CPT

## 2018-07-11 PROCEDURE — 83735 ASSAY OF MAGNESIUM: CPT

## 2018-07-11 PROCEDURE — 63600175 PHARM REV CODE 636 W HCPCS: Performed by: ORTHOPAEDIC SURGERY

## 2018-07-11 PROCEDURE — 93010 ELECTROCARDIOGRAM REPORT: CPT | Mod: ,,, | Performed by: INTERNAL MEDICINE

## 2018-07-11 PROCEDURE — 96372 THER/PROPH/DIAG INJ SC/IM: CPT | Mod: 59

## 2018-07-11 PROCEDURE — 84100 ASSAY OF PHOSPHORUS: CPT

## 2018-07-11 PROCEDURE — 36415 COLL VENOUS BLD VENIPUNCTURE: CPT

## 2018-07-11 RX ORDER — ONDANSETRON 2 MG/ML
4 INJECTION INTRAMUSCULAR; INTRAVENOUS EVERY 8 HOURS PRN
Status: DISCONTINUED | OUTPATIENT
Start: 2018-07-11 | End: 2018-07-12

## 2018-07-11 RX ORDER — DIPHENHYDRAMINE HYDROCHLORIDE 50 MG/ML
12.5 INJECTION INTRAMUSCULAR; INTRAVENOUS
Status: COMPLETED | OUTPATIENT
Start: 2018-07-11 | End: 2018-07-11

## 2018-07-11 RX ORDER — CEFAZOLIN SODIUM 2 G/50ML
2 SOLUTION INTRAVENOUS
Status: DISCONTINUED | OUTPATIENT
Start: 2018-07-11 | End: 2018-07-11

## 2018-07-11 RX ORDER — SODIUM CHLORIDE 9 MG/ML
INJECTION, SOLUTION INTRAVENOUS CONTINUOUS
Status: DISCONTINUED | OUTPATIENT
Start: 2018-07-11 | End: 2018-07-13 | Stop reason: HOSPADM

## 2018-07-11 RX ORDER — CEFAZOLIN SODIUM 1 G/3ML
2 INJECTION, POWDER, FOR SOLUTION INTRAMUSCULAR; INTRAVENOUS
Status: DISCONTINUED | OUTPATIENT
Start: 2018-07-11 | End: 2018-07-11 | Stop reason: SDUPTHER

## 2018-07-11 RX ORDER — MORPHINE SULFATE 4 MG/ML
2 INJECTION, SOLUTION INTRAMUSCULAR; INTRAVENOUS EVERY 4 HOURS PRN
Status: DISCONTINUED | OUTPATIENT
Start: 2018-07-11 | End: 2018-07-12

## 2018-07-11 RX ORDER — METHYLPREDNISOLONE SOD SUCC 125 MG
125 VIAL (EA) INJECTION
Status: COMPLETED | OUTPATIENT
Start: 2018-07-11 | End: 2018-07-11

## 2018-07-11 RX ORDER — PANTOPRAZOLE SODIUM 40 MG/1
40 TABLET, DELAYED RELEASE ORAL DAILY
Status: DISCONTINUED | OUTPATIENT
Start: 2018-07-12 | End: 2018-07-13 | Stop reason: HOSPADM

## 2018-07-11 RX ORDER — MORPHINE SULFATE 4 MG/ML
4 INJECTION, SOLUTION INTRAMUSCULAR; INTRAVENOUS EVERY 4 HOURS PRN
Status: DISCONTINUED | OUTPATIENT
Start: 2018-07-11 | End: 2018-07-11

## 2018-07-11 RX ORDER — HEPARIN SODIUM 5000 [USP'U]/ML
5000 INJECTION, SOLUTION INTRAVENOUS; SUBCUTANEOUS ONCE
Status: COMPLETED | OUTPATIENT
Start: 2018-07-11 | End: 2018-07-11

## 2018-07-11 RX ORDER — VANCOMYCIN HCL IN 5 % DEXTROSE 1G/250ML
1000 PLASTIC BAG, INJECTION (ML) INTRAVENOUS
Status: COMPLETED | OUTPATIENT
Start: 2018-07-11 | End: 2018-07-12

## 2018-07-11 RX ORDER — FAMOTIDINE 10 MG/ML
20 INJECTION INTRAVENOUS
Status: COMPLETED | OUTPATIENT
Start: 2018-07-11 | End: 2018-07-11

## 2018-07-11 RX ORDER — CEFAZOLIN SODIUM 2 G/50ML
2 SOLUTION INTRAVENOUS
Status: COMPLETED | OUTPATIENT
Start: 2018-07-11 | End: 2018-07-11

## 2018-07-11 RX ADMIN — VANCOMYCIN HYDROCHLORIDE 1000 MG: 1 INJECTION, POWDER, LYOPHILIZED, FOR SOLUTION INTRAVENOUS at 11:07

## 2018-07-11 RX ADMIN — SODIUM CHLORIDE: 0.9 INJECTION, SOLUTION INTRAVENOUS at 10:07

## 2018-07-11 RX ADMIN — SODIUM CHLORIDE: 0.9 INJECTION, SOLUTION INTRAVENOUS at 07:07

## 2018-07-11 RX ADMIN — HEPARIN SODIUM 5000 UNITS: 5000 INJECTION, SOLUTION INTRAVENOUS; SUBCUTANEOUS at 11:07

## 2018-07-11 RX ADMIN — DIPHENHYDRAMINE HYDROCHLORIDE 12.5 MG: 50 INJECTION, SOLUTION INTRAMUSCULAR; INTRAVENOUS at 11:07

## 2018-07-11 RX ADMIN — METHYLPREDNISOLONE SODIUM SUCCINATE 125 MG: 125 INJECTION, POWDER, FOR SOLUTION INTRAMUSCULAR; INTRAVENOUS at 11:07

## 2018-07-11 RX ADMIN — MORPHINE SULFATE 4 MG: 4 INJECTION INTRAVENOUS at 08:07

## 2018-07-11 RX ADMIN — FAMOTIDINE 20 MG: 10 INJECTION INTRAVENOUS at 11:07

## 2018-07-11 RX ADMIN — CEFAZOLIN SODIUM 2 G: 2 SOLUTION INTRAVENOUS at 08:07

## 2018-07-11 NOTE — ED PROVIDER NOTES
"SCRIBE #1 NOTE: I, Annamarie Shaw, am scribing for, and in the presence of, Minor Gant PA-C. I have scribed the entire note.      History      Chief Complaint   Patient presents with    Wound Check     Surgery 3 weeks ago to L foot. Stitches removed 1 week ago. Pt states she "reopened my wound." Bandage to L foot with some oozing of blood.        Review of patient's allergies indicates:   Allergen Reactions    Codeine      "i dont like the funny feeling it gives me"        HPI   HPI    7/11/2018, 6:25 PM   History obtained from the patient      History of Present Illness: Nayana Bledsoe is a 73 y.o. female patient who presents to the Emergency Department for a wound check. Pt is approximately 3 weeks S/P ORIF L calcaneous fx by Dr. Maria (Orthopedics). Tonight just PTA, she reports that she slipped while getting out of the tub and reflexively stepped down abruptly onto her recently surgically repaired left foot to prevent a fall. She states that her surgical incision has "split open." No associated or additional injuries or concerns.     Arrival mode: Personal vehicle    PCP: Jonatan Joyce Iii, MD       Past Medical History:  Past Medical History:   Diagnosis Date    Acid reflux     Arthritis     Hiatal hernia        Past Surgical History:  Past Surgical History:   Procedure Laterality Date    CARPAL TUNNEL RELEASE Right     CHOLECYSTECTOMY      COLONOSCOPY      ESOPHAGEAL DILATION      multiple times    HYSTERECTOMY      OPEN REDUCTION AND INTERNAL FIXATION (ORIF) OF INJURY OF FOOT Left 6/18/2018    Procedure: ORIF, FOOT;  Surgeon: Jim Maria MD;  Location: [facility];  Service: Orthopedics;  Laterality: Left;  Calcaneous    TONSILLECTOMY           Family History:  History reviewed. No pertinent family history.    Social History:  Social History     Social History Main Topics    Smoking status: Never Smoker    Smokeless tobacco: Never Used    Alcohol use Yes      Comment: on weekends " "   Drug use: No    Sexual activity: Not on file       ROS   Review of Systems   Constitutional: Negative for chills and fever.        (-) fall  (-) hitting L foot   Respiratory: Negative for cough, chest tightness and shortness of breath.    Cardiovascular: Negative for chest pain and leg swelling.   Gastrointestinal: Negative for abdominal pain, diarrhea, nausea and vomiting.   Genitourinary: Negative for decreased urine volume, dysuria and flank pain.   Musculoskeletal: Negative for arthralgias.   Skin: Positive for wound (L foot).   Neurological: Negative for dizziness, seizures, syncope, facial asymmetry, speech difficulty, weakness, light-headedness, numbness and headaches.   Psychiatric/Behavioral: Negative for confusion. The patient is nervous/anxious.      Physical Exam      Initial Vitals [07/11/18 1751]   BP Pulse Resp Temp SpO2   126/69 72 16 98.9 °F (37.2 °C) 95 %      MAP       --          Physical Exam  Nursing Notes and Vital Signs Reviewed.  Constitutional: Patient is in mild distress. She is accompanied by her family.   Head: Atraumatic.   Eyes: PERRL. EOM intact.   ENT: Mucous membranes are moist.    Cardiovascular: Regular rate. Regular rhythm.   Pulmonary/Chest: No respiratory distress.  Abdominal: Soft and non-tender.  Musculoskeletal: Moves all extremities.   Left ankle:  Sub-acute surgical incision to the posterior aspect of L heel that has an acute traumatic dehiscence - see image.   Skin: Warm and dry.  Neurological:  Alert, awake, and appropriate. AAO x 3. Normal speech. No acute focal neurological deficits are appreciated.  Psychiatric: Anxious.         ED Course    Procedures  ED Vital Signs:  Vitals:    07/11/18 1751   BP: 126/69   Pulse: 72   Resp: 16   Temp: 98.9 °F (37.2 °C)   TempSrc: Oral   SpO2: 95%   Weight: 61.2 kg (135 lb)   Height: 4' 11" (1.499 m)       Abnormal Lab Results:  Labs Reviewed   CBC W/ AUTO DIFFERENTIAL - Abnormal; Notable for the following:        Result Value "    MCH 33.8 (*)     Gran # (ANC) 8.5 (*)     Gran% 75.5 (*)     Lymph% 15.4 (*)     All other components within normal limits   BASIC METABOLIC PANEL - Abnormal; Notable for the following:     CO2 22 (*)     BUN, Bld 25 (*)     eGFR if  53 (*)     eGFR if non  46 (*)     All other components within normal limits        All Lab Results:  Results for orders placed or performed during the hospital encounter of 07/11/18   CBC auto differential   Result Value Ref Range    WBC 11.18 3.90 - 12.70 K/uL    RBC 4.74 4.00 - 5.40 M/uL    Hemoglobin 16.0 12.0 - 16.0 g/dL    Hematocrit 44.7 37.0 - 48.5 %    MCV 94 82 - 98 fL    MCH 33.8 (H) 27.0 - 31.0 pg    MCHC 35.8 32.0 - 36.0 g/dL    RDW 13.2 11.5 - 14.5 %    Platelets 219 150 - 350 K/uL    MPV 10.5 9.2 - 12.9 fL    Gran # (ANC) 8.5 (H) 1.8 - 7.7 K/uL    Lymph # 1.7 1.0 - 4.8 K/uL    Mono # 0.9 0.3 - 1.0 K/uL    Eos # 0.1 0.0 - 0.5 K/uL    Baso # 0.02 0.00 - 0.20 K/uL    Gran% 75.5 (H) 38.0 - 73.0 %    Lymph% 15.4 (L) 18.0 - 48.0 %    Mono% 7.8 4.0 - 15.0 %    Eosinophil% 1.1 0.0 - 8.0 %    Basophil% 0.2 0.0 - 1.9 %    Differential Method Automated    Basic metabolic panel   Result Value Ref Range    Sodium 139 136 - 145 mmol/L    Potassium 4.4 3.5 - 5.1 mmol/L    Chloride 105 95 - 110 mmol/L    CO2 22 (L) 23 - 29 mmol/L    Glucose 89 70 - 110 mg/dL    BUN, Bld 25 (H) 8 - 23 mg/dL    Creatinine 1.2 0.5 - 1.4 mg/dL    Calcium 9.2 8.7 - 10.5 mg/dL    Anion Gap 12 8 - 16 mmol/L    eGFR if African American 53 (A) >60 mL/min/1.73 m^2    eGFR if non African American 46 (A) >60 mL/min/1.73 m^2         Imaging Results:  Imaging Results          CT Ankle (Including Hindfoot) Without Contrast Left (Final result)  Result time 07/11/18 19:54:41    Final result by Tod Cotter Jr., MD (07/11/18 19:54:41)                 Impression:      1.  Widening at the calcaneal fracture site with evidence of loosening of the posterior screw.  There is ulceration of  the soft tissues of the heel with gas at the fracture site.  Osteomyelitis not excluded.      Electronically signed by: Tod Cotter Jr., MD  Date:    07/11/2018  Time:    19:54             Narrative:    EXAMINATION:  CT ANKLE (INCLUDING HINDFOOT) WITHOUT CONTRAST LEFT    CLINICAL HISTORY:  Fracture, ankle;  Displaced avulsion fracture of tuberosity of left calcaneus, initial encounter for open fracture    TECHNIQUE:  CT scan was obtained of the left ankle without contrast.  Coronal and sagittal reconstructions were performed on these images.    COMPARISON:  06/18/2018.    FINDINGS:  Interval placement of 2 intact screws within the calcaneus in the interval transverse and a comminuted calcaneal fracture.  There is widening and gas within the fracture site of the body of the calcaneus.  The posterior screw demonstrates lucency around the head concerning for loosening.  There is ulceration of the soft tissues of the heel.  No erosive changes of the bone.                               X-Ray Calcaneus 2 View Left (Final result)  Result time 07/11/18 18:47:15    Final result by Jimi Crespo Jr., MD (07/11/18 18:47:15)                 Impression:      Detrimental change in the calcaneal fracture.  Fracture widening in the interval, as above      Electronically signed by: Jimi Crespo MD  Date:    07/11/2018  Time:    18:47             Narrative:    EXAMINATION:  XR CALCANEUS 2 VIEW LEFT    CLINICAL HISTORY:  Unspecified complication of procedure, initial encounter    COMPARISON:  06/27/2018.    FINDINGS:  Detrimental change.  Interval widening of the previously seen fracture through the body of the calcaneus.  Separation of the fracture fragments by approximately 5 mm.  The 2 orthopedic screws remain in place.  Soft tissue swelling.                                        The Emergency Provider reviewed the vital signs and test results, which are outlined above.    ED Discussion     6:44 PM: Minor Gant PA-C  discussed the pt's case with Dr. Maria (Orthopedics) who states that he expects to take the patient to the OR in the AM. He requests that the patient be admitted to his service and be made NPO after midnight. He requests basic labs, Ancef 2 gm IV, wound care, post-op boot, and medicine consult.     6:50 PM: Discussed case with Nancy Pearl ([facility]).      6:50 PM: Re-evaluated pt. I have discussed test results, shared treatment plan, and the need for admission with patient and family at bedside. Pt and family express understanding at this time and agree with all information. All questions answered. Pt and family have no further questions or concerns at this time. Pt is ready for admit.    ED Medication(s):  Medications   0.9%  NaCl infusion ( Intravenous New Bag 7/11/18 1953)   morphine injection 4 mg (4 mg Intravenous Given 7/11/18 2007)   ondansetron injection 4 mg (not administered)   cefazolin (ANCEF) 2 gram in dextrose 5% 50 mL IVPB (premix) (2 g Intravenous New Bag 7/11/18 2037)       New Prescriptions    No medications on file             Medical Decision Making    Medical Decision Making:   History:   Old Medical Records: I decided to obtain old medical records.  Clinical Tests:   Lab Tests: Ordered  Radiological Study: Ordered and Reviewed           Scribe Attestation:   Scribe #1: I performed the above scribed service and the documentation accurately describes the services I performed. I attest to the accuracy of the note.    Attending:   Physician Attestation Statement for Scribe #1: I, Minor Gant PA-C, personally performed the services described in this documentation, as scribed by Annamarie Shaw, in my presence, and it is both accurate and complete.          Clinical Impression       ICD-10-CM ICD-9-CM   1. Postoperative complication T81.9XXA 998.9   2. Problem involving surgical incision T81.89XA 998.83   3. Open displaced avulsion fracture of tuberosity of left calcaneus, initial  encounter S92.032B 825.1       Disposition:   Disposition: Placed in Observation  Condition: Stable              Minor Gant PA-C  07/11/18 2044

## 2018-07-12 ENCOUNTER — ANESTHESIA EVENT (OUTPATIENT)
Dept: SURGERY | Facility: HOSPITAL | Age: 73
End: 2018-07-12
Payer: MEDICARE

## 2018-07-12 ENCOUNTER — ANESTHESIA (OUTPATIENT)
Dept: SURGERY | Facility: HOSPITAL | Age: 73
End: 2018-07-12
Payer: MEDICARE

## 2018-07-12 PROBLEM — T81.9XXA POSTOPERATIVE COMPLICATION: Status: ACTIVE | Noted: 2018-07-12

## 2018-07-12 LAB
ABO + RH BLD: NORMAL
ANION GAP SERPL CALC-SCNC: 7 MMOL/L
APTT BLDCRRT: 25.2 SEC
BASOPHILS # BLD AUTO: 0.01 K/UL
BASOPHILS NFR BLD: 0.1 %
BLD GP AB SCN CELLS X3 SERPL QL: NORMAL
BUN SERPL-MCNC: 18 MG/DL
CALCIUM SERPL-MCNC: 8.6 MG/DL
CHLORIDE SERPL-SCNC: 108 MMOL/L
CHOLEST SERPL-MCNC: 211 MG/DL
CHOLEST/HDLC SERPL: 6.2 {RATIO}
CO2 SERPL-SCNC: 22 MMOL/L
CREAT SERPL-MCNC: 0.8 MG/DL
DIFFERENTIAL METHOD: ABNORMAL
EOSINOPHIL # BLD AUTO: 0 K/UL
EOSINOPHIL NFR BLD: 0.1 %
ERYTHROCYTE [DISTWIDTH] IN BLOOD BY AUTOMATED COUNT: 13 %
EST. GFR  (AFRICAN AMERICAN): >60 ML/MIN/1.73 M^2
EST. GFR  (NON AFRICAN AMERICAN): >60 ML/MIN/1.73 M^2
ESTIMATED AVG GLUCOSE: 105 MG/DL
GLUCOSE SERPL-MCNC: 134 MG/DL
HBA1C MFR BLD HPLC: 5.3 %
HCT VFR BLD AUTO: 41.4 %
HDLC SERPL-MCNC: 34 MG/DL
HDLC SERPL: 16.1 %
HGB BLD-MCNC: 14.8 G/DL
INR PPP: 1.1
LDLC SERPL CALC-MCNC: 158.8 MG/DL
LYMPHOCYTES # BLD AUTO: 1.1 K/UL
LYMPHOCYTES NFR BLD: 12.8 %
MCH RBC QN AUTO: 33.9 PG
MCHC RBC AUTO-ENTMCNC: 35.7 G/DL
MCV RBC AUTO: 95 FL
MONOCYTES # BLD AUTO: 0.1 K/UL
MONOCYTES NFR BLD: 0.7 %
NEUTROPHILS # BLD AUTO: 7.7 K/UL
NEUTROPHILS NFR BLD: 86.3 %
NONHDLC SERPL-MCNC: 177 MG/DL
PLATELET # BLD AUTO: 201 K/UL
PMV BLD AUTO: 10.4 FL
POTASSIUM SERPL-SCNC: 3.9 MMOL/L
PROTHROMBIN TIME: 11 SEC
RBC # BLD AUTO: 4.37 M/UL
SODIUM SERPL-SCNC: 137 MMOL/L
TRIGL SERPL-MCNC: 91 MG/DL
TSH SERPL DL<=0.005 MIU/L-ACNC: 1.43 UIU/ML
VANCOMYCIN SERPL-MCNC: 4.7 UG/ML
WBC # BLD AUTO: 8.92 K/UL

## 2018-07-12 PROCEDURE — 11012 DEB SKIN BONE AT FX SITE: CPT | Mod: 78,59,, | Performed by: ORTHOPAEDIC SURGERY

## 2018-07-12 PROCEDURE — 85730 THROMBOPLASTIN TIME PARTIAL: CPT

## 2018-07-12 PROCEDURE — 25000003 PHARM REV CODE 250: Performed by: NURSE ANESTHETIST, CERTIFIED REGISTERED

## 2018-07-12 PROCEDURE — 85610 PROTHROMBIN TIME: CPT

## 2018-07-12 PROCEDURE — 27201423 OPTIME MED/SURG SUP & DEVICES STERILE SUPPLY: Performed by: ORTHOPAEDIC SURGERY

## 2018-07-12 PROCEDURE — 63600175 PHARM REV CODE 636 W HCPCS: Performed by: NURSE PRACTITIONER

## 2018-07-12 PROCEDURE — C1713 ANCHOR/SCREW BN/BN,TIS/BN: HCPCS | Performed by: ORTHOPAEDIC SURGERY

## 2018-07-12 PROCEDURE — 28415 OPTX CALCANEAL FRACTURE: CPT | Mod: 78,LT,, | Performed by: ORTHOPAEDIC SURGERY

## 2018-07-12 PROCEDURE — 25000003 PHARM REV CODE 250: Performed by: PHYSICIAN ASSISTANT

## 2018-07-12 PROCEDURE — 25000003 PHARM REV CODE 250: Performed by: NURSE PRACTITIONER

## 2018-07-12 PROCEDURE — 25000242 PHARM REV CODE 250 ALT 637 W/ HCPCS: Performed by: NURSE ANESTHETIST, CERTIFIED REGISTERED

## 2018-07-12 PROCEDURE — 84443 ASSAY THYROID STIM HORMONE: CPT

## 2018-07-12 PROCEDURE — G0378 HOSPITAL OBSERVATION PER HR: HCPCS

## 2018-07-12 PROCEDURE — 63600175 PHARM REV CODE 636 W HCPCS: Performed by: ANESTHESIOLOGY

## 2018-07-12 PROCEDURE — 80061 LIPID PANEL: CPT

## 2018-07-12 PROCEDURE — 25000003 PHARM REV CODE 250: Performed by: ORTHOPAEDIC SURGERY

## 2018-07-12 PROCEDURE — 63600175 PHARM REV CODE 636 W HCPCS: Performed by: NURSE ANESTHETIST, CERTIFIED REGISTERED

## 2018-07-12 PROCEDURE — 36000709 HC OR TIME LEV III EA ADD 15 MIN: Performed by: ORTHOPAEDIC SURGERY

## 2018-07-12 PROCEDURE — 80202 ASSAY OF VANCOMYCIN: CPT

## 2018-07-12 PROCEDURE — 36000708 HC OR TIME LEV III 1ST 15 MIN: Performed by: ORTHOPAEDIC SURGERY

## 2018-07-12 PROCEDURE — 37000008 HC ANESTHESIA 1ST 15 MINUTES: Performed by: ORTHOPAEDIC SURGERY

## 2018-07-12 PROCEDURE — C1769 GUIDE WIRE: HCPCS | Performed by: ORTHOPAEDIC SURGERY

## 2018-07-12 PROCEDURE — 94799 UNLISTED PULMONARY SVC/PX: CPT

## 2018-07-12 PROCEDURE — 36415 COLL VENOUS BLD VENIPUNCTURE: CPT

## 2018-07-12 PROCEDURE — 37000009 HC ANESTHESIA EA ADD 15 MINS: Performed by: ORTHOPAEDIC SURGERY

## 2018-07-12 PROCEDURE — 27685 REVISION OF LOWER LEG TENDON: CPT | Mod: 78,51,LT, | Performed by: ORTHOPAEDIC SURGERY

## 2018-07-12 PROCEDURE — 71000039 HC RECOVERY, EACH ADD'L HOUR: Performed by: ORTHOPAEDIC SURGERY

## 2018-07-12 PROCEDURE — 83036 HEMOGLOBIN GLYCOSYLATED A1C: CPT

## 2018-07-12 PROCEDURE — 96366 THER/PROPH/DIAG IV INF ADDON: CPT | Mod: 59

## 2018-07-12 PROCEDURE — 71000033 HC RECOVERY, INTIAL HOUR: Performed by: ORTHOPAEDIC SURGERY

## 2018-07-12 PROCEDURE — 80048 BASIC METABOLIC PNL TOTAL CA: CPT

## 2018-07-12 PROCEDURE — 85025 COMPLETE CBC W/AUTO DIFF WBC: CPT

## 2018-07-12 PROCEDURE — 86901 BLOOD TYPING SEROLOGIC RH(D): CPT

## 2018-07-12 PROCEDURE — 97605 NEG PRS WND THER DME<=50SQCM: CPT | Mod: 78,59,, | Performed by: ORTHOPAEDIC SURGERY

## 2018-07-12 DEVICE — IMPLANTABLE DEVICE: Type: IMPLANTABLE DEVICE | Site: HEEL | Status: FUNCTIONAL

## 2018-07-12 RX ORDER — FUROSEMIDE 40 MG/1
40 TABLET ORAL DAILY
Status: DISCONTINUED | OUTPATIENT
Start: 2018-07-12 | End: 2018-07-13 | Stop reason: HOSPADM

## 2018-07-12 RX ORDER — LIDOCAINE HYDROCHLORIDE 10 MG/ML
INJECTION INFILTRATION; PERINEURAL
Status: DISCONTINUED | OUTPATIENT
Start: 2018-07-12 | End: 2018-07-12

## 2018-07-12 RX ORDER — SODIUM CHLORIDE 0.9 % (FLUSH) 0.9 %
3 SYRINGE (ML) INJECTION
Status: DISCONTINUED | OUTPATIENT
Start: 2018-07-12 | End: 2018-07-12 | Stop reason: HOSPADM

## 2018-07-12 RX ORDER — DIPHENHYDRAMINE HCL 25 MG
25 CAPSULE ORAL EVERY 6 HOURS PRN
Status: DISCONTINUED | OUTPATIENT
Start: 2018-07-12 | End: 2018-07-13 | Stop reason: HOSPADM

## 2018-07-12 RX ORDER — HYDRALAZINE HYDROCHLORIDE 20 MG/ML
10 INJECTION INTRAMUSCULAR; INTRAVENOUS EVERY 6 HOURS PRN
Status: DISCONTINUED | OUTPATIENT
Start: 2018-07-12 | End: 2018-07-13 | Stop reason: HOSPADM

## 2018-07-12 RX ORDER — SODIUM CHLORIDE, SODIUM LACTATE, POTASSIUM CHLORIDE, CALCIUM CHLORIDE 600; 310; 30; 20 MG/100ML; MG/100ML; MG/100ML; MG/100ML
INJECTION, SOLUTION INTRAVENOUS CONTINUOUS PRN
Status: DISCONTINUED | OUTPATIENT
Start: 2018-07-12 | End: 2018-07-12

## 2018-07-12 RX ORDER — CHLORHEXIDINE GLUCONATE ORAL RINSE 1.2 MG/ML
10 SOLUTION DENTAL 2 TIMES DAILY
Status: DISCONTINUED | OUTPATIENT
Start: 2018-07-12 | End: 2018-07-13

## 2018-07-12 RX ORDER — CIPROFLOXACIN AND DEXAMETHASONE 3; 1 MG/ML; MG/ML
4 SUSPENSION/ DROPS AURICULAR (OTIC) 2 TIMES DAILY
Status: DISCONTINUED | OUTPATIENT
Start: 2018-07-12 | End: 2018-07-13 | Stop reason: HOSPADM

## 2018-07-12 RX ORDER — FENTANYL CITRATE 50 UG/ML
INJECTION, SOLUTION INTRAMUSCULAR; INTRAVENOUS
Status: DISCONTINUED | OUTPATIENT
Start: 2018-07-12 | End: 2018-07-12

## 2018-07-12 RX ORDER — ROPIVACAINE HYDROCHLORIDE 5 MG/ML
INJECTION, SOLUTION EPIDURAL; INFILTRATION; PERINEURAL
Status: DISPENSED
Start: 2018-07-12 | End: 2018-07-13

## 2018-07-12 RX ORDER — ALBUTEROL SULFATE 90 UG/1
AEROSOL, METERED RESPIRATORY (INHALATION)
Status: DISCONTINUED | OUTPATIENT
Start: 2018-07-12 | End: 2018-07-12

## 2018-07-12 RX ORDER — ONDANSETRON 8 MG/1
8 TABLET, ORALLY DISINTEGRATING ORAL EVERY 8 HOURS PRN
Status: DISCONTINUED | OUTPATIENT
Start: 2018-07-12 | End: 2018-07-13 | Stop reason: HOSPADM

## 2018-07-12 RX ORDER — SUCCINYLCHOLINE CHLORIDE 20 MG/ML
INJECTION INTRAMUSCULAR; INTRAVENOUS
Status: DISCONTINUED | OUTPATIENT
Start: 2018-07-12 | End: 2018-07-12

## 2018-07-12 RX ORDER — BUPIVACAINE HYDROCHLORIDE 2.5 MG/ML
INJECTION, SOLUTION EPIDURAL; INFILTRATION; INTRACAUDAL
Status: DISCONTINUED | OUTPATIENT
Start: 2018-07-12 | End: 2018-07-12 | Stop reason: HOSPADM

## 2018-07-12 RX ORDER — SODIUM CHLORIDE 0.9 % (FLUSH) 0.9 %
5 SYRINGE (ML) INJECTION
Status: DISCONTINUED | OUTPATIENT
Start: 2018-07-12 | End: 2018-07-13 | Stop reason: HOSPADM

## 2018-07-12 RX ORDER — OXYCODONE AND ACETAMINOPHEN 5; 325 MG/1; MG/1
1 TABLET ORAL EVERY 4 HOURS PRN
Status: DISCONTINUED | OUTPATIENT
Start: 2018-07-12 | End: 2018-07-13 | Stop reason: HOSPADM

## 2018-07-12 RX ORDER — MEPERIDINE HYDROCHLORIDE 50 MG/ML
12.5 INJECTION INTRAMUSCULAR; INTRAVENOUS; SUBCUTANEOUS ONCE AS NEEDED
Status: DISCONTINUED | OUTPATIENT
Start: 2018-07-12 | End: 2018-07-12 | Stop reason: HOSPADM

## 2018-07-12 RX ORDER — ONDANSETRON 2 MG/ML
4 INJECTION INTRAMUSCULAR; INTRAVENOUS EVERY 12 HOURS PRN
Status: DISCONTINUED | OUTPATIENT
Start: 2018-07-12 | End: 2018-07-13 | Stop reason: HOSPADM

## 2018-07-12 RX ORDER — ACETAMINOPHEN 10 MG/ML
1000 INJECTION, SOLUTION INTRAVENOUS ONCE
Status: DISCONTINUED | OUTPATIENT
Start: 2018-07-12 | End: 2018-07-12 | Stop reason: HOSPADM

## 2018-07-12 RX ORDER — FENTANYL CITRATE 50 UG/ML
25 INJECTION, SOLUTION INTRAMUSCULAR; INTRAVENOUS EVERY 5 MIN PRN
Status: COMPLETED | OUTPATIENT
Start: 2018-07-12 | End: 2018-07-12

## 2018-07-12 RX ORDER — BISACODYL 5 MG
10 TABLET, DELAYED RELEASE (ENTERIC COATED) ORAL DAILY PRN
Status: DISCONTINUED | OUTPATIENT
Start: 2018-07-12 | End: 2018-07-13 | Stop reason: HOSPADM

## 2018-07-12 RX ORDER — MORPHINE SULFATE 2 MG/ML
2 INJECTION, SOLUTION INTRAMUSCULAR; INTRAVENOUS EVERY 4 HOURS PRN
Status: DISCONTINUED | OUTPATIENT
Start: 2018-07-12 | End: 2018-07-13 | Stop reason: HOSPADM

## 2018-07-12 RX ORDER — ESCITALOPRAM OXALATE 10 MG/1
20 TABLET ORAL NIGHTLY
Status: DISCONTINUED | OUTPATIENT
Start: 2018-07-12 | End: 2018-07-13 | Stop reason: HOSPADM

## 2018-07-12 RX ORDER — ENOXAPARIN SODIUM 100 MG/ML
40 INJECTION SUBCUTANEOUS EVERY 24 HOURS
Status: DISCONTINUED | OUTPATIENT
Start: 2018-07-13 | End: 2018-07-12

## 2018-07-12 RX ORDER — PROPOFOL 10 MG/ML
VIAL (ML) INTRAVENOUS
Status: DISCONTINUED | OUTPATIENT
Start: 2018-07-12 | End: 2018-07-12

## 2018-07-12 RX ORDER — DEXAMETHASONE SODIUM PHOSPHATE 4 MG/ML
INJECTION, SOLUTION INTRA-ARTICULAR; INTRALESIONAL; INTRAMUSCULAR; INTRAVENOUS; SOFT TISSUE
Status: DISCONTINUED | OUTPATIENT
Start: 2018-07-12 | End: 2018-07-12

## 2018-07-12 RX ORDER — ROCURONIUM BROMIDE 10 MG/ML
INJECTION, SOLUTION INTRAVENOUS
Status: DISCONTINUED | OUTPATIENT
Start: 2018-07-12 | End: 2018-07-12

## 2018-07-12 RX ORDER — ENOXAPARIN SODIUM 100 MG/ML
40 INJECTION SUBCUTANEOUS EVERY 24 HOURS
Status: DISCONTINUED | OUTPATIENT
Start: 2018-07-13 | End: 2018-07-13 | Stop reason: HOSPADM

## 2018-07-12 RX ORDER — GLYCOPYRROLATE 0.2 MG/ML
INJECTION INTRAMUSCULAR; INTRAVENOUS
Status: DISCONTINUED | OUTPATIENT
Start: 2018-07-12 | End: 2018-07-12

## 2018-07-12 RX ORDER — SODIUM CHLORIDE 0.9 % (FLUSH) 0.9 %
3 SYRINGE (ML) INJECTION EVERY 8 HOURS
Status: DISCONTINUED | OUTPATIENT
Start: 2018-07-12 | End: 2018-07-12 | Stop reason: HOSPADM

## 2018-07-12 RX ADMIN — PROPOFOL 150 MG: 10 INJECTION, EMULSION INTRAVENOUS at 11:07

## 2018-07-12 RX ADMIN — ROBINUL 0.2 MG: 0.2 INJECTION INTRAMUSCULAR; INTRAVENOUS at 11:07

## 2018-07-12 RX ADMIN — SODIUM CHLORIDE, SODIUM LACTATE, POTASSIUM CHLORIDE, AND CALCIUM CHLORIDE: 600; 310; 30; 20 INJECTION, SOLUTION INTRAVENOUS at 11:07

## 2018-07-12 RX ADMIN — FENTANYL CITRATE 100 MCG: 50 INJECTION, SOLUTION INTRAMUSCULAR; INTRAVENOUS at 11:07

## 2018-07-12 RX ADMIN — ALBUTEROL SULFATE 2 PUFF: 90 AEROSOL, METERED RESPIRATORY (INHALATION) at 11:07

## 2018-07-12 RX ADMIN — FENTANYL CITRATE 25 MCG: 50 INJECTION, SOLUTION INTRAMUSCULAR; INTRAVENOUS at 02:07

## 2018-07-12 RX ADMIN — PIPERACILLIN SODIUM AND TAZOBACTAM SODIUM 4.5 G: 4; .5 INJECTION, POWDER, LYOPHILIZED, FOR SOLUTION INTRAVENOUS at 10:07

## 2018-07-12 RX ADMIN — PIPERACILLIN SODIUM AND TAZOBACTAM SODIUM 4.5 G: 4; .5 INJECTION, POWDER, LYOPHILIZED, FOR SOLUTION INTRAVENOUS at 03:07

## 2018-07-12 RX ADMIN — FENTANYL CITRATE 50 MCG: 50 INJECTION, SOLUTION INTRAMUSCULAR; INTRAVENOUS at 01:07

## 2018-07-12 RX ADMIN — ESCITALOPRAM OXALATE 20 MG: 10 TABLET, FILM COATED ORAL at 08:07

## 2018-07-12 RX ADMIN — CHLORHEXIDINE GLUCONATE 10 ML: 1.2 RINSE ORAL at 08:07

## 2018-07-12 RX ADMIN — FUROSEMIDE 40 MG: 40 TABLET ORAL at 04:07

## 2018-07-12 RX ADMIN — DIPHENHYDRAMINE HYDROCHLORIDE 25 MG: 25 CAPSULE ORAL at 10:07

## 2018-07-12 RX ADMIN — DEXAMETHASONE SODIUM PHOSPHATE 4 MG: 4 INJECTION, SOLUTION INTRA-ARTICULAR; INTRALESIONAL; INTRAMUSCULAR; INTRAVENOUS; SOFT TISSUE at 11:07

## 2018-07-12 RX ADMIN — ROCURONIUM BROMIDE 5 MG: 10 INJECTION, SOLUTION INTRAVENOUS at 11:07

## 2018-07-12 RX ADMIN — SODIUM CHLORIDE, SODIUM LACTATE, POTASSIUM CHLORIDE, AND CALCIUM CHLORIDE: 600; 310; 30; 20 INJECTION, SOLUTION INTRAVENOUS at 12:07

## 2018-07-12 RX ADMIN — LIDOCAINE HYDROCHLORIDE 40 MG: 10 INJECTION, SOLUTION INFILTRATION; PERINEURAL at 11:07

## 2018-07-12 RX ADMIN — PROPOFOL 50 MG: 10 INJECTION, EMULSION INTRAVENOUS at 11:07

## 2018-07-12 RX ADMIN — PIPERACILLIN SODIUM AND TAZOBACTAM SODIUM 4.5 G: 4; .5 INJECTION, POWDER, LYOPHILIZED, FOR SOLUTION INTRAVENOUS at 04:07

## 2018-07-12 RX ADMIN — PIPERACILLIN SODIUM AND TAZOBACTAM SODIUM 4.5 G: 4; .5 INJECTION, POWDER, LYOPHILIZED, FOR SOLUTION INTRAVENOUS at 11:07

## 2018-07-12 RX ADMIN — ROCURONIUM BROMIDE 20 MG: 10 INJECTION, SOLUTION INTRAVENOUS at 11:07

## 2018-07-12 RX ADMIN — SUCCINYLCHOLINE CHLORIDE 120 MG: 20 INJECTION, SOLUTION INTRAMUSCULAR; INTRAVENOUS at 11:07

## 2018-07-12 NOTE — ANESTHESIA PREPROCEDURE EVALUATION
07/12/2018  Nayana Bledsoe is a 73 y.o., female.    Pre-op Assessment    I have reviewed the Patient Summary Reports.     I have reviewed the Nursing Notes.      Review of Systems  Anesthesia Hx:  Denies Family Hx of Anesthesia complications.   Denies Personal Hx of Anesthesia complications.   Social:  Alcohol Use, Non-Smoker    Hematology/Oncology:  Hematology Normal   Oncology Normal     EENT/Dental:EENT/Dental Normal   Cardiovascular:   hyperlipidemia Carotid stenosis   Pulmonary:  Pulmonary Normal    Hepatic/GI:   Hiatal Hernia, GERD    Musculoskeletal:   Arthritis  Calcaneal fracture.  Osteomyelitis left foot.    Neurological:   Neuromuscular Disease, Cervical   Endocrine:  Endocrine Normal    Dermatological:  Skin Normal    Psych:  Psychiatric Normal           Physical Exam  General:  Obesity    Airway/Jaw/Neck:  Airway Findings: Mouth Opening: Normal General Airway Assessment: Adult  Mallampati: II  TM Distance: Normal, at least 6 cm      Dental:  Dental Findings: In tact   Chest/Lungs:  Chest/Lungs Findings: Clear to auscultation, Normal Respiratory Rate     Heart/Vascular:  Heart Findings: Rate: Normal  Rhythm: Regular Rhythm        Mental Status:  Mental Status Findings:  Cooperative, Alert and Oriented         Anesthesia Plan  Type of Anesthesia, risks & benefits discussed:  Anesthesia Type:  general  Patient's Preference:   Intra-op Monitoring Plan: standard ASA monitors  Intra-op Monitoring Plan Comments:   Post Op Pain Control Plan: IV/PO Opioids PRN  Post Op Pain Control Plan Comments:   Induction:   IV  Beta Blocker:  Patient is not currently on a Beta-Blocker (No further documentation required).       Informed Consent: Patient understands risks and agrees with Anesthesia plan.  Questions answered. Anesthesia consent signed with patient.  ASA Score: 3     Day of Surgery Review of History &  Physical:

## 2018-07-12 NOTE — CONSULTS
"[facility] Hospital Medicine  Consult Note    Patient Name: Nayana Bledsoe  MRN: [id number]  Admission Date: 7/11/2018  Hospital Length of Stay: 0 days  Attending Physician: Jim Maria MD   Primary Care Provider: Jonatan Joyce Iii, MD           Patient information was obtained from patient, past medical records and ER records.     Consults  Subjective:     Principal Problem: Problem involving surgical incision    Chief Complaint:   Chief Complaint   Patient presents with    Wound Check     Surgery 3 weeks ago to L foot. Stitches removed 1 week ago. Pt states she "reopened my wound." Bandage to L foot with some oozing of blood.         HPI: Nayana Bledsoe is a 73 y.o. female patient who presents for a wound check of left heal that opened after getting out of tub. Tonight just PTA, she reports that she slipped while getting out of the tub and reflexively stepped down abruptly onto her recently surgically repaired left foot to prevent a fall. She states that her surgical incision has "split open." . Patient reports she felt fine prior to incident today with no complaints. Associated symptoms include heal/ankle pain of Left. ROM of Left ankle worsens. Neurovascular is intact on exam. To note. pt is approximately 3 weeks S/P ORIF L calcaneous fx by Dr. Maria (Orthopedics). No associated or additional injuries or concerns. Case was discussed with ORTHO by ER who accepted patient with plan for surgical intervention tomorrow, hospital medicine consulted for medical management/surgical risk. Patient reports only significant medical history includes hyperlipidemia and Caroid stenosis.  Patient has never had MI, Stroke, denies HTN, DM or any other medical conditions. Preop testing ordered and pending.     Past Medical History:   Diagnosis Date    Acid reflux     Arthritis     Hiatal hernia        Past Surgical History:   Procedure Laterality Date    CARPAL TUNNEL RELEASE Right     " "CHOLECYSTECTOMY      COLONOSCOPY      ESOPHAGEAL DILATION      multiple times    HYSTERECTOMY      OPEN REDUCTION AND INTERNAL FIXATION (ORIF) OF INJURY OF FOOT Left 6/18/2018    Procedure: ORIF, FOOT;  Surgeon: Jim Maria MD;  Location: [facility];  Service: Orthopedics;  Laterality: Left;  Calcaneous    TONSILLECTOMY         Review of patient's allergies indicates:   Allergen Reactions    Codeine      "i dont like the funny feeling it gives me"       No current facility-administered medications on file prior to encounter.      Current Outpatient Prescriptions on File Prior to Encounter   Medication Sig    ascorbic acid, vitamin C, (VITAMIN C) 1000 MG tablet Take 1,000 mg by mouth.    aspirin (ECOTRIN) 81 MG EC tablet Take 1 tablet (81 mg total) by mouth once daily.    COMPOUND HORMONE REPLACEMENT Take 1 tablet by mouth once daily.    escitalopram oxalate (LEXAPRO) 20 MG tablet Take one and one half tablet daily    furosemide (LASIX) 40 MG tablet Take 40 mg by mouth.    magnesium 250 mg Tab Take by mouth.    multivitamin capsule Take 1 capsule by mouth once daily.    ondansetron (ZOFRAN-ODT) 4 MG TbDL Take 2 tablets (8 mg total) by mouth every 8 (eight) hours as needed.    oxyCODONE-acetaminophen (PERCOCET) 5-325 mg per tablet Take 1-2 tablets every 4-8 hrs as needed for pain.    pantoprazole (PROTONIX) 40 MG tablet Take 40 mg by mouth.    UNKNOWN TO PATIENT      Family History     Reviewed and not Pertinent           Social History Main Topics    Smoking status: Never Smoker    Smokeless tobacco: Never Used    Alcohol use Yes      Comment: on weekends    Drug use: No    Sexual activity: Not on file     Review of Systems   Constitutional: Negative for chills, diaphoresis, fatigue and fever.   HENT: Negative for congestion, sore throat and voice change.    Eyes: Negative for photophobia and visual disturbance.   Respiratory: Negative for cough, shortness of breath, wheezing and stridor.  "   Cardiovascular: Negative for chest pain and leg swelling.   Gastrointestinal: Negative for abdominal distention, abdominal pain, constipation, diarrhea, nausea and vomiting.   Endocrine: Negative for polydipsia, polyphagia and polyuria.   Genitourinary: Negative for difficulty urinating, dysuria, flank pain, pelvic pain, urgency and vaginal discharge.   Musculoskeletal: Positive for arthralgias. Negative for back pain, joint swelling, neck pain and neck stiffness.   Skin: Positive for wound. Negative for color change and rash.   Allergic/Immunologic: Negative for immunocompromised state.   Neurological: Negative for dizziness, syncope, weakness, numbness and headaches.   Hematological: Does not bruise/bleed easily.   Psychiatric/Behavioral: Negative for agitation, behavioral problems and confusion.     Objective:     Vital Signs (Most Recent):  Temp: 98.9 °F (37.2 °C) (07/11/18 1751)  Pulse: 72 (07/11/18 1751)  Resp: 16 (07/11/18 1751)  BP: 126/69 (07/11/18 1751)  SpO2: 95 % (07/11/18 1751) Vital Signs (24h Range):  Temp:  [98.9 °F (37.2 °C)] 98.9 °F (37.2 °C)  Pulse:  [72] 72  Resp:  [16] 16  SpO2:  [95 %] 95 %  BP: (126)/(69) 126/69     Weight: 61.2 kg (135 lb)  Body mass index is 27.27 kg/m².    Physical Exam   Constitutional: She is oriented to person, place, and time. She appears well-developed and well-nourished. No distress.   HENT:   Head: Normocephalic and atraumatic.   Nose: Nose normal.   Eyes: Conjunctivae and EOM are normal. Pupils are equal, round, and reactive to light. No scleral icterus.   Neck: Normal range of motion. Neck supple. No tracheal deviation present.   Cardiovascular: Normal rate, regular rhythm, normal heart sounds and intact distal pulses.    No murmur heard.  Pulmonary/Chest: Effort normal and breath sounds normal. No stridor. No respiratory distress. She has no wheezes. She has no rales.   Abdominal: Soft. Bowel sounds are normal. She exhibits no distension. There is no tenderness.  There is no guarding.   Musculoskeletal: Normal range of motion. She exhibits no edema or deformity.   decreased rom Left ankle     Neurological: She is alert and oriented to person, place, and time. No cranial nerve deficit.   Skin: Skin is warm and dry. Capillary refill takes less than 2 seconds. No rash noted. She is not diaphoretic.   See image     Psychiatric: She has a normal mood and affect. Her behavior is normal. Judgment and thought content normal.   Nursing note and vitals reviewed.    Left heal           Significant Labs: All pertinent labs within the past 24 hours have been reviewed.  Results for orders placed or performed during the hospital encounter of 07/11/18   CBC auto differential   Result Value Ref Range    WBC 11.18 3.90 - 12.70 K/uL    RBC 4.74 4.00 - 5.40 M/uL    Hemoglobin 16.0 12.0 - 16.0 g/dL    Hematocrit 44.7 37.0 - 48.5 %    MCV 94 82 - 98 fL    MCH 33.8 (H) 27.0 - 31.0 pg    MCHC 35.8 32.0 - 36.0 g/dL    RDW 13.2 11.5 - 14.5 %    Platelets 219 150 - 350 K/uL    MPV 10.5 9.2 - 12.9 fL    Gran # (ANC) 8.5 (H) 1.8 - 7.7 K/uL    Lymph # 1.7 1.0 - 4.8 K/uL    Mono # 0.9 0.3 - 1.0 K/uL    Eos # 0.1 0.0 - 0.5 K/uL    Baso # 0.02 0.00 - 0.20 K/uL    Gran% 75.5 (H) 38.0 - 73.0 %    Lymph% 15.4 (L) 18.0 - 48.0 %    Mono% 7.8 4.0 - 15.0 %    Eosinophil% 1.1 0.0 - 8.0 %    Basophil% 0.2 0.0 - 1.9 %    Differential Method Automated    Basic metabolic panel   Result Value Ref Range    Sodium 139 136 - 145 mmol/L    Potassium 4.4 3.5 - 5.1 mmol/L    Chloride 105 95 - 110 mmol/L    CO2 22 (L) 23 - 29 mmol/L    Glucose 89 70 - 110 mg/dL    BUN, Bld 25 (H) 8 - 23 mg/dL    Creatinine 1.2 0.5 - 1.4 mg/dL    Calcium 9.2 8.7 - 10.5 mg/dL    Anion Gap 12 8 - 16 mmol/L    eGFR if African American 53 (A) >60 mL/min/1.73 m^2    eGFR if non African American 46 (A) >60 mL/min/1.73 m^2       Significant Imaging: I have reviewed all pertinent imaging results/findings within the past 24 hours.   Imaging Results           CT Ankle (Including Hindfoot) Without Contrast Left (Final result)  Result time 07/11/18 19:54:41    Final result by Tod Cotter Jr., MD (07/11/18 19:54:41)                 Impression:      1.  Widening at the calcaneal fracture site with evidence of loosening of the posterior screw.  There is ulceration of the soft tissues of the heel with gas at the fracture site.  Osteomyelitis not excluded.      Electronically signed by: Tod Cotter Jr., MD  Date:    07/11/2018  Time:    19:54             Narrative:    EXAMINATION:  CT ANKLE (INCLUDING HINDFOOT) WITHOUT CONTRAST LEFT    CLINICAL HISTORY:  Fracture, ankle;  Displaced avulsion fracture of tuberosity of left calcaneus, initial encounter for open fracture    TECHNIQUE:  CT scan was obtained of the left ankle without contrast.  Coronal and sagittal reconstructions were performed on these images.    COMPARISON:  06/18/2018.    FINDINGS:  Interval placement of 2 intact screws within the calcaneus in the interval transverse and a comminuted calcaneal fracture.  There is widening and gas within the fracture site of the body of the calcaneus.  The posterior screw demonstrates lucency around the head concerning for loosening.  There is ulceration of the soft tissues of the heel.  No erosive changes of the bone.                               X-Ray Calcaneus 2 View Left (Final result)  Result time 07/11/18 18:47:15    Final result by Jimi Crespo Jr., MD (07/11/18 18:47:15)                 Impression:      Detrimental change in the calcaneal fracture.  Fracture widening in the interval, as above      Electronically signed by: Jimi Crespo MD  Date:    07/11/2018  Time:    18:47             Narrative:    EXAMINATION:  XR CALCANEUS 2 VIEW LEFT    CLINICAL HISTORY:  Unspecified complication of procedure, initial encounter    COMPARISON:  06/27/2018.    FINDINGS:  Detrimental change.  Interval widening of the previously seen fracture through the body of the  calcaneus.  Separation of the fracture fragments by approximately 5 mm.  The 2 orthopedic screws remain in place.  Soft tissue swelling.                                I have personally reviewed the patients labs, imaging, and discussed the patient case in detail with the Er provider    Assessment/Plan:     * Problem involving surgical incision    Ortho managing  Local wound care          Preop examination      Pending          Osteomyelitis of left foot, suspecion     CT report : 1.  Widening at the calcaneal fracture site with evidence of loosening of the posterior screw.  There is ulceration of the soft tissues of the heel with gas at the fracture site.  Osteomyelitis not excluded.  Cover with Vanc/zosyn  Monitor  Consider MRI pending course         Elevated BP without diagnosis of hypertension    Prehypertensive #  Monitor trends  Consider daily therapy pending course          Hyperlipidemia      No statin noted  Check lipid, a1c          Calcaneus fracture, left    Ortho managing  Control pain  Monitor for complications  See CT result  Plan for surgical intervention tomorrow  1x Heparin SQ now for DVT prophylaxis            VTE Risk Mitigation         Ordered     heparin (porcine) injection 5,000 Units  Once      07/11/18 2137     Place sequential compression device  Until discontinued      07/11/18 2137     IP VTE LOW RISK PATIENT  Once      07/11/18 1844              Thank you for your consult.  will follow-up with patient. Please contact us if you have any additional questions.    Jr Simms NP  Department of Hospital Medicine   [facility]

## 2018-07-12 NOTE — ED NOTES
The pts voice remains unchanged at this time. Pt denies any sob or difficulty breathing. Pt sats 98% on room air. Pt watching tv in no apparent distress. Bed lowest position, call light in reach and side rails up x2. Will continue to monitor.

## 2018-07-12 NOTE — CONSULTS
Nayana Bledsoe [id number] is a 73 y.o. female who has been consulted for vancomycin dosing.  Consult ordered by Jr Simms NP     Dx: SSTI w/ probable Osteomyelitis  Vancomycin goal trough = 15-20 mcg/mL  Concomitant abx: Zosyn 4.5 every 8 hours    Tmax: 98.9 F,  Cultures not noted at the time of this note.   The patient has the following labs:     Date Creatinine (mg/dl)    BUN WBC Count   7/12/2018 Estimated Creatinine Clearance: 41.5 mL/min (based on SCr of 1.2 mg/dL). Lab Results   Component Value Date    BUN 25 (H) 07/11/2018     Lab Results   Component Value Date    WBC 11.18 07/11/2018      which calculates to an Estimated Creatinine Clearance: 41.5 mL/min (based on SCr of 1.2 mg/dL)..       Current weight is 61.2 kg (135 lb)  DW: 51.8 kg    The patient will be started on vancomycin at a dose of 1000 mg in ED. We will pulse dose this patient in order to obtain a goal trough of 15-20 mcg/mL.  A placeholder dose of Vancomycin 750 mg every 48 hours has been ordered and we will adjust as needed with each random level.     It is expected that the dose and interval will be Vancomycin 750 mg every 24 hours but pulse dosing will ensure her dose and interval is correct.     Patient will be followed by pharmacy for changes in renal function, toxicity, and efficacy.    The first vancomycin random level has been ordered for 7/12 at 2330.      Thank you for allowing us to participate in this patient's care.     Jr Evans

## 2018-07-12 NOTE — PROGRESS NOTES
"[facility] Hospital Medicine  Progress Note    Patient Name: Nayana Bledsoe  MRN: [id number]  Patient Class: OP- Observation   Admission Date: 7/11/2018  Length of Stay: 0 days  Attending Physician: Jim Maria MD  Primary Care Provider: Jonatan Joyce Iii, MD        Subjective:     Principal Problem:Problem involving surgical incision    HPI:  Nayana Bledsoe is a 73 y.o. female patient who presents for a wound check of left heal that opened after getting out of tub. Tonight just PTA, she reports that she slipped while getting out of the tub and reflexively stepped down abruptly onto her recently surgically repaired left foot to prevent a fall. She states that her surgical incision has "split open." . Patient reports she felt fine prior to incident today with no complaints. Associated symptoms include heal/ankle pain of Left. ROM of Left ankle worsens. Neurovascular is intact on exam. To note. pt is approximately 3 weeks S/P ORIF L calcaneous fx by Dr. Maria (Orthopedics). No associated or additional injuries or concerns. Case was discussed with ORTHO by ER who accepted patient with plan for surgical intervention tomorrow, hospital medicine consulted for medical management/surgical risk. Patient reports only significant medical history includes hyperlipidemia and Caroid stenosis.  Patient has never had MI, Stroke, denies HTN, DM or any other medical conditions. Preop testing ordered and pending.     Hospital Course:  7/12/18 S/P ORIF of the left calcaneus per Dr. Maria. The patient complains of left ear pain. Patient tolerated the procedure well.     Interval History: S/P ORIF of the left calcaneus per Dr. Maria. The patient complains of left ear pain. Patient tolerated the procedure well.     Review of Systems   Constitutional: Negative for activity change, appetite change, chills, diaphoresis, fatigue, fever and unexpected weight change.   HENT: Negative for congestion, drooling, " facial swelling, rhinorrhea, sinus pressure, sneezing, sore throat and voice change.    Eyes: Negative for photophobia, discharge, redness, itching and visual disturbance.   Respiratory: Negative for apnea, cough, choking, chest tightness, shortness of breath, wheezing and stridor.    Cardiovascular: Negative for chest pain, palpitations and leg swelling.   Gastrointestinal: Negative for abdominal distention, abdominal pain, anal bleeding, blood in stool, constipation, diarrhea, nausea and vomiting.   Endocrine: Negative for polydipsia, polyphagia and polyuria.   Genitourinary: Negative for decreased urine volume, difficulty urinating, dysuria, flank pain, frequency, hematuria, pelvic pain, urgency, vaginal bleeding and vaginal discharge.   Musculoskeletal: Positive for arthralgias. Negative for back pain, gait problem, joint swelling, myalgias, neck pain and neck stiffness.   Skin: Positive for wound. Negative for color change, pallor and rash.   Allergic/Immunologic: Negative for immunocompromised state.   Neurological: Negative for dizziness, seizures, syncope, facial asymmetry, speech difficulty, weakness, light-headedness, numbness and headaches.   Hematological: Does not bruise/bleed easily.   Psychiatric/Behavioral: Negative for agitation, behavioral problems, confusion, hallucinations and suicidal ideas.   All other systems reviewed and are negative.    Objective:     Vital Signs (Most Recent):  Temp: 97.8 °F (36.6 °C) (07/12/18 1547)  Pulse: 63 (07/12/18 1547)  Resp: 16 (07/12/18 1547)  BP: 124/62 (07/12/18 1547)  SpO2: (!) 94 % (07/12/18 1547) Vital Signs (24h Range):  Temp:  [97.4 °F (36.3 °C)-98.9 °F (37.2 °C)] 97.8 °F (36.6 °C)  Pulse:  [53-79] 63  Resp:  [13-20] 16  SpO2:  [93 %-100 %] 94 %  BP: ()/(50-75) 124/62     Weight: 61.2 kg (135 lb)  Body mass index is 27.27 kg/m².    Intake/Output Summary (Last 24 hours) at 07/12/18 9631  Last data filed at 07/12/18 1359   Gross per 24 hour   Intake              1650 ml   Output               20 ml   Net             1630 ml      Physical Exam   Constitutional: She is oriented to person, place, and time. She appears well-developed and well-nourished. No distress.   HENT:   Head: Normocephalic and atraumatic.   Nose: Nose normal.   Eyes: Conjunctivae and EOM are normal. Pupils are equal, round, and reactive to light. No scleral icterus.   Neck: Normal range of motion. Neck supple. No tracheal deviation present.   Cardiovascular: Normal rate, regular rhythm, normal heart sounds and intact distal pulses.    No murmur heard.  Pulmonary/Chest: Effort normal and breath sounds normal. No stridor. No respiratory distress. She has no wheezes. She has no rales.   Abdominal: Soft. Bowel sounds are normal. She exhibits no distension. There is no tenderness. There is no guarding.   Musculoskeletal: Normal range of motion. She exhibits no edema, tenderness or deformity.   decreased rom Left ankle, LLE surgical dressing in place.      Neurological: She is alert and oriented to person, place, and time. She has normal reflexes. No cranial nerve deficit.   Skin: Skin is warm and dry. Capillary refill takes less than 2 seconds. No rash noted. She is not diaphoretic. No erythema.   See image     Psychiatric: She has a normal mood and affect. Her behavior is normal. Judgment and thought content normal.   Nursing note and vitals reviewed.      Significant Labs: All pertinent labs within the past 24 hours have been reviewed.    Significant Imaging:   Imaging Results          X-Ray Chest 1 View (Final result)  Result time 07/11/18 21:54:40    Final result by Tod Cotter Jr., MD (07/11/18 21:54:40)                 Impression:      No acute cardiopulmonary disease.  Cardiomegaly.      Electronically signed by: Tod Cotter Jr., MD  Date:    07/11/2018  Time:    21:54             Narrative:    EXAMINATION:  XR CHEST 1 VIEW    CLINICAL HISTORY:  pre  op;    COMPARISON:  None    FINDINGS:  Lungs are clear.  Cardiac silhouette is enlarged.  There is air bubble that projects over the left heart consistent with hiatal hernia.  No effusion or pneumothorax.  Osseous structures are within normal limits.                               CT Ankle (Including Hindfoot) Without Contrast Left (Final result)  Result time 07/11/18 19:54:41    Final result by Tod Cotter Jr., MD (07/11/18 19:54:41)                 Impression:      1.  Widening at the calcaneal fracture site with evidence of loosening of the posterior screw.  There is ulceration of the soft tissues of the heel with gas at the fracture site.  Osteomyelitis not excluded.      Electronically signed by: Tod Cotter Jr., MD  Date:    07/11/2018  Time:    19:54             Narrative:    EXAMINATION:  CT ANKLE (INCLUDING HINDFOOT) WITHOUT CONTRAST LEFT    CLINICAL HISTORY:  Fracture, ankle;  Displaced avulsion fracture of tuberosity of left calcaneus, initial encounter for open fracture    TECHNIQUE:  CT scan was obtained of the left ankle without contrast.  Coronal and sagittal reconstructions were performed on these images.    COMPARISON:  06/18/2018.    FINDINGS:  Interval placement of 2 intact screws within the calcaneus in the interval transverse and a comminuted calcaneal fracture.  There is widening and gas within the fracture site of the body of the calcaneus.  The posterior screw demonstrates lucency around the head concerning for loosening.  There is ulceration of the soft tissues of the heel.  No erosive changes of the bone.                               X-Ray Calcaneus 2 View Left (Final result)  Result time 07/11/18 18:47:15    Final result by Jimi Crespo Jr., MD (07/11/18 18:47:15)                 Impression:      Detrimental change in the calcaneal fracture.  Fracture widening in the interval, as above      Electronically signed by: Jimi Crespo MD  Date:    07/11/2018  Time:    18:47              Narrative:    EXAMINATION:  XR CALCANEUS 2 VIEW LEFT    CLINICAL HISTORY:  Unspecified complication of procedure, initial encounter    COMPARISON:  06/27/2018.    FINDINGS:  Detrimental change.  Interval widening of the previously seen fracture through the body of the calcaneus.  Separation of the fracture fragments by approximately 5 mm.  The 2 orthopedic screws remain in place.  Soft tissue swelling.                                   Assessment/Plan:      * Problem involving surgical incision    Ortho managing  Local wound care          Preop examination      Pending          Osteomyelitis of left foot, suspecion     CT report : 1.  Widening at the calcaneal fracture site with evidence of loosening of the posterior screw.  There is ulceration of the soft tissues of the heel with gas at the fracture site.  Osteomyelitis not excluded.  Cover with Vanc/zosyn  Monitor  Consider MRI pending course         Elevated BP without diagnosis of hypertension    Prehypertensive #  Monitor trends  Consider daily therapy pending course          Hyperlipidemia    No statin noted  Check lipid, a1c          Calcaneus fracture, left    Ortho managing  Control pain  Monitor for complications  See CT result  Plan for surgical intervention tomorrow  1x Heparin SQ now for DVT prophylaxis  7/12/18 S/P left ORIF of Calcaneus, tolerated well.           VTE Risk Mitigation         Ordered     enoxaparin injection 40 mg  Daily      07/12/18 1741     Place sequential compression device  Until discontinued      07/12/18 1742     Place sequential compression device  Until discontinued      07/12/18 1552     Place sequential compression device  Until discontinued      07/11/18 2137     IP VTE LOW RISK PATIENT  Once      07/11/18 1844              Vin Hughes NP  Department of Hospital Medicine   [facility]

## 2018-07-12 NOTE — ED NOTES
"The reports feeling restless. "I cant get my legs right in the bed". The pt was helped out of the bed and repositioned back in the back for better comfort. The pts stated nothing is helping and I cant sleep."  "

## 2018-07-12 NOTE — H&P
"[facility] Hospital Medicine  Consult Note     Patient Name: Nayana Bledsoe  MRN: [id number]  Admission Date: 7/11/2018  Hospital Length of Stay: 0 days  Attending Physician: Jim Maria MD   Primary Care Provider: Jonatan Joyce Iii, MD             Patient information was obtained from patient, past medical records and ER records.      Consults  Subjective:      Principal Problem: Problem involving surgical incision     Chief Complaint:        Chief Complaint   Patient presents with    Wound Check       Surgery 3 weeks ago to L foot. Stitches removed 1 week ago. Pt states she "reopened my wound." Bandage to L foot with some oozing of blood.          HPI: Nayana Bledsoe is a 73 y.o. female patient who presents for a wound check of left heal that opened after getting out of tub. Tonight just PTA, she reports that she slipped while getting out of the tub and reflexively stepped down abruptly onto her recently surgically repaired left foot to prevent a fall. She states that her surgical incision has "split open." . Patient reports she felt fine prior to incident today with no complaints. Associated symptoms include heal/ankle pain of Left. ROM of Left ankle worsens. Neurovascular is intact on exam. To note. pt is approximately 3 weeks S/P ORIF L calcaneous fx by Dr. Maria (Orthopedics). No associated or additional injuries or concerns. Case was discussed with ORTHO by ER who accepted patient with plan for surgical intervention tomorrow, hospital medicine consulted for medical management/surgical risk. Patient reports only significant medical history includes hyperlipidemia and Caroid stenosis.  Patient has never had MI, Stroke, denies HTN, DM or any other medical conditions. Preop testing ordered and pending.           Past Medical History:   Diagnosis Date    Acid reflux      Arthritis      Hiatal hernia                 Past Surgical History:   Procedure Laterality Date    CARPAL TUNNEL " "RELEASE Right      CHOLECYSTECTOMY        COLONOSCOPY        ESOPHAGEAL DILATION         multiple times    HYSTERECTOMY        OPEN REDUCTION AND INTERNAL FIXATION (ORIF) OF INJURY OF FOOT Left 6/18/2018     Procedure: ORIF, FOOT;  Surgeon: Jim Maria MD;  Location: [facility];  Service: Orthopedics;  Laterality: Left;  Calcaneous    TONSILLECTOMY                   Review of patient's allergies indicates:   Allergen Reactions    Codeine         "i dont like the funny feeling it gives me"         No current facility-administered medications on file prior to encounter.            Current Outpatient Prescriptions on File Prior to Encounter   Medication Sig    ascorbic acid, vitamin C, (VITAMIN C) 1000 MG tablet Take 1,000 mg by mouth.    aspirin (ECOTRIN) 81 MG EC tablet Take 1 tablet (81 mg total) by mouth once daily.    COMPOUND HORMONE REPLACEMENT Take 1 tablet by mouth once daily.    escitalopram oxalate (LEXAPRO) 20 MG tablet Take one and one half tablet daily    furosemide (LASIX) 40 MG tablet Take 40 mg by mouth.    magnesium 250 mg Tab Take by mouth.    multivitamin capsule Take 1 capsule by mouth once daily.    ondansetron (ZOFRAN-ODT) 4 MG TbDL Take 2 tablets (8 mg total) by mouth every 8 (eight) hours as needed.    oxyCODONE-acetaminophen (PERCOCET) 5-325 mg per tablet Take 1-2 tablets every 4-8 hrs as needed for pain.    pantoprazole (PROTONIX) 40 MG tablet Take 40 mg by mouth.    UNKNOWN TO PATIENT        Family History      Reviewed and not Pertinent                     Social History Main Topics    Smoking status: Never Smoker    Smokeless tobacco: Never Used    Alcohol use Yes         Comment: on weekends    Drug use: No    Sexual activity: Not on file      Review of Systems   Constitutional: Negative for chills, diaphoresis, fatigue and fever.   HENT: Negative for congestion, sore throat and voice change.    Eyes: Negative for photophobia and visual disturbance. "   Respiratory: Negative for cough, shortness of breath, wheezing and stridor.    Cardiovascular: Negative for chest pain and leg swelling.   Gastrointestinal: Negative for abdominal distention, abdominal pain, constipation, diarrhea, nausea and vomiting.   Endocrine: Negative for polydipsia, polyphagia and polyuria.   Genitourinary: Negative for difficulty urinating, dysuria, flank pain, pelvic pain, urgency and vaginal discharge.   Musculoskeletal: Positive for arthralgias. Negative for back pain, joint swelling, neck pain and neck stiffness.   Skin: Positive for wound. Negative for color change and rash.   Allergic/Immunologic: Negative for immunocompromised state.   Neurological: Negative for dizziness, syncope, weakness, numbness and headaches.   Hematological: Does not bruise/bleed easily.   Psychiatric/Behavioral: Negative for agitation, behavioral problems and confusion.      Objective:      Vital Signs (Most Recent):  Temp: 98.9 °F (37.2 °C) (07/11/18 1751)  Pulse: 72 (07/11/18 1751)  Resp: 16 (07/11/18 1751)  BP: 126/69 (07/11/18 1751)  SpO2: 95 % (07/11/18 1751) Vital Signs (24h Range):  Temp:  [98.9 °F (37.2 °C)] 98.9 °F (37.2 °C)  Pulse:  [72] 72  Resp:  [16] 16  SpO2:  [95 %] 95 %  BP: (126)/(69) 126/69      Weight: 61.2 kg (135 lb)  Body mass index is 27.27 kg/m².     Physical Exam   Constitutional: She is oriented to person, place, and time. She appears well-developed and well-nourished. No distress.   HENT:   Head: Normocephalic and atraumatic.   Nose: Nose normal.   Eyes: Conjunctivae and EOM are normal. Pupils are equal, round, and reactive to light. No scleral icterus.   Neck: Normal range of motion. Neck supple. No tracheal deviation present.   Cardiovascular: Normal rate, regular rhythm, normal heart sounds and intact distal pulses.    No murmur heard.  Pulmonary/Chest: Effort normal and breath sounds normal. No stridor. No respiratory distress. She has no wheezes. She has no rales.   Abdominal:  Soft. Bowel sounds are normal. She exhibits no distension. There is no tenderness. There is no guarding.   Musculoskeletal: Normal range of motion. She exhibits no edema or deformity.   decreased rom Left ankle     Neurological: She is alert and oriented to person, place, and time. No cranial nerve deficit.   Skin: Skin is warm and dry. Capillary refill takes less than 2 seconds. No rash noted. She is not diaphoretic.   See image     Psychiatric: She has a normal mood and affect. Her behavior is normal. Judgment and thought content normal.   Nursing note and vitals reviewed.     Left heal              Significant Labs: All pertinent labs within the past 24 hours have been reviewed.        Results for orders placed or performed during the hospital encounter of 07/11/18   CBC auto differential   Result Value Ref Range     WBC 11.18 3.90 - 12.70 K/uL     RBC 4.74 4.00 - 5.40 M/uL     Hemoglobin 16.0 12.0 - 16.0 g/dL     Hematocrit 44.7 37.0 - 48.5 %     MCV 94 82 - 98 fL     MCH 33.8 (H) 27.0 - 31.0 pg     MCHC 35.8 32.0 - 36.0 g/dL     RDW 13.2 11.5 - 14.5 %     Platelets 219 150 - 350 K/uL     MPV 10.5 9.2 - 12.9 fL     Gran # (ANC) 8.5 (H) 1.8 - 7.7 K/uL     Lymph # 1.7 1.0 - 4.8 K/uL     Mono # 0.9 0.3 - 1.0 K/uL     Eos # 0.1 0.0 - 0.5 K/uL     Baso # 0.02 0.00 - 0.20 K/uL     Gran% 75.5 (H) 38.0 - 73.0 %     Lymph% 15.4 (L) 18.0 - 48.0 %     Mono% 7.8 4.0 - 15.0 %     Eosinophil% 1.1 0.0 - 8.0 %     Basophil% 0.2 0.0 - 1.9 %     Differential Method Automated     Basic metabolic panel   Result Value Ref Range     Sodium 139 136 - 145 mmol/L     Potassium 4.4 3.5 - 5.1 mmol/L     Chloride 105 95 - 110 mmol/L     CO2 22 (L) 23 - 29 mmol/L     Glucose 89 70 - 110 mg/dL     BUN, Bld 25 (H) 8 - 23 mg/dL     Creatinine 1.2 0.5 - 1.4 mg/dL     Calcium 9.2 8.7 - 10.5 mg/dL     Anion Gap 12 8 - 16 mmol/L     eGFR if African American 53 (A) >60 mL/min/1.73 m^2     eGFR if non African American 46 (A) >60 mL/min/1.73 m^2          Significant Imaging: I have reviewed all pertinent imaging results/findings within the past 24 hours.       Imaging Results                   CT Ankle (Including Hindfoot) Without Contrast Left (Final result)  Result time 07/11/18 19:54:41                Final result by Tod Cotter Jr., MD (07/11/18 19:54:41)                               Impression:        1.  Widening at the calcaneal fracture site with evidence of loosening of the posterior screw.  There is ulceration of the soft tissues of the heel with gas at the fracture site.  Osteomyelitis not excluded.        Electronically signed by:     Tod Cotter Jr., MD  Date:                                   07/11/2018  Time:                                   19:54                         Narrative:     EXAMINATION:  CT ANKLE (INCLUDING HINDFOOT) WITHOUT CONTRAST LEFT     CLINICAL HISTORY:  Fracture, ankle;  Displaced avulsion fracture of tuberosity of left calcaneus, initial encounter for open fracture     TECHNIQUE:  CT scan was obtained of the left ankle without contrast.  Coronal and sagittal reconstructions were performed on these images.     COMPARISON:  06/18/2018.     FINDINGS:  Interval placement of 2 intact screws within the calcaneus in the interval transverse and a comminuted calcaneal fracture.  There is widening and gas within the fracture site of the body of the calcaneus.  The posterior screw demonstrates lucency around the head concerning for loosening.  There is ulceration of the soft tissues of the heel.  No erosive changes of the bone.                                                  X-Ray Calcaneus 2 View Left (Final result)  Result time 07/11/18 18:47:15                Final result by Jimi Crespo Jr., MD (07/11/18 18:47:15)                               Impression:        Detrimental change in the calcaneal fracture.  Fracture widening in the interval, as above        Electronically signed by:     Jimi Crespo MD  Date:                                    07/11/2018  Time:                                   18:47                         Narrative:     EXAMINATION:  XR CALCANEUS 2 VIEW LEFT     CLINICAL HISTORY:  Unspecified complication of procedure, initial encounter     COMPARISON:  06/27/2018.     FINDINGS:  Detrimental change.  Interval widening of the previously seen fracture through the body of the calcaneus.  Separation of the fracture fragments by approximately 5 mm.  The 2 orthopedic screws remain in place.  Soft tissue swelling.                                             I have personally reviewed the patients labs, imaging, and discussed the patient case in detail with the Er provider     Assessment/Plan:          * Problem involving surgical incision     Ortho managing  Local wound care             Preop examination        Pending             Osteomyelitis of left foot, suspecion      CT report : 1.  Widening at the calcaneal fracture site with evidence of loosening of the posterior screw.  There is ulceration of the soft tissues of the heel with gas at the fracture site.  Osteomyelitis not excluded.  Cover with Vanc/zosyn  Monitor  Consider MRI pending course           Elevated BP without diagnosis of hypertension     Prehypertensive #  Monitor trends  Consider daily therapy pending course             Hyperlipidemia        No statin noted  Check lipid, a1c             Calcaneus fracture, left     Ortho managing  Control pain  Monitor for complications  See CT result  Plan for surgical intervention tomorrow  1x Heparin SQ now for DVT prophylaxis                      VTE Risk Mitigation          Ordered       heparin (porcine) injection 5,000 Units  Once      07/11/18 2137       Place sequential compression device  Until discontinued      07/11/18 2137       IP VTE LOW RISK PATIENT  Once      07/11/18 1844                   Thank you for your consult. HM will follow-up with patient. Please contact us if you have any  additional questions.     Jr Simms NP  Department of Hospital Medicine   [facility]    Agree I have discussed with patient and family indications, risks, benefits and alternatives to left foot irrigation and debridement, ORIF calcaneus and tendo achilles lengthening. Risks including but not limited to infection, damage to blood vessels, nerves, tendons, failure of the repair, risks of anesthesia and possible need for more surgery discussed. She and family expressed good understanding. No guarantees given nor implied.

## 2018-07-12 NOTE — HPI
"Nayana Bledsoe is a 73 y.o. female patient who presents for a wound check of left heal that opened after getting out of tub. Tonight just PTA, she reports that she slipped while getting out of the tub and reflexively stepped down abruptly onto her recently surgically repaired left foot to prevent a fall. She states that her surgical incision has "split open." . Patient reports she felt fine prior to incident today with no complaints. Associated symptoms include heal/ankle pain of Left. ROM of Left ankle worsens. Neurovascular is intact on exam. To note. pt is approximately 3 weeks S/P ORIF L calcaneous fx by Dr. Maria (Orthopedics). No associated or additional injuries or concerns. Case was discussed with ORTHO by ER who accepted patient with plan for surgical intervention tomorrow, hospital medicine consulted for medical management/surgical risk. Patient reports only significant medical history includes hyperlipidemia and Caroid stenosis.  Patient has never had MI, Stroke, denies HTN, DM or any other medical conditions. Preop testing ordered and pending.   "

## 2018-07-12 NOTE — ASSESSMENT & PLAN NOTE
Ortho managing  Control pain  Monitor for complications  See CT result  Plan for surgical intervention tomorrow  1x Heparin SQ now for DVT prophylaxis  7/12/18 S/P left ORIF of Calcaneus, tolerated well.

## 2018-07-12 NOTE — ASSESSMENT & PLAN NOTE
Ortho managing  Control pain  Monitor for complications  See CT result  Plan for surgical intervention tomorrow  1x Heparin SQ now for DVT prophylaxis

## 2018-07-12 NOTE — SUBJECTIVE & OBJECTIVE
Interval History: S/P ORIF of the left calcaneus per Dr. Maria. The patient complains of left ear pain. Patient tolerated the procedure well.     Review of Systems   Constitutional: Negative for activity change, appetite change, chills, diaphoresis, fatigue, fever and unexpected weight change.   HENT: Negative for congestion, drooling, facial swelling, rhinorrhea, sinus pressure, sneezing, sore throat and voice change.    Eyes: Negative for photophobia, discharge, redness, itching and visual disturbance.   Respiratory: Negative for apnea, cough, choking, chest tightness, shortness of breath, wheezing and stridor.    Cardiovascular: Negative for chest pain, palpitations and leg swelling.   Gastrointestinal: Negative for abdominal distention, abdominal pain, anal bleeding, blood in stool, constipation, diarrhea, nausea and vomiting.   Endocrine: Negative for polydipsia, polyphagia and polyuria.   Genitourinary: Negative for decreased urine volume, difficulty urinating, dysuria, flank pain, frequency, hematuria, pelvic pain, urgency, vaginal bleeding and vaginal discharge.   Musculoskeletal: Positive for arthralgias. Negative for back pain, gait problem, joint swelling, myalgias, neck pain and neck stiffness.   Skin: Positive for wound. Negative for color change, pallor and rash.   Allergic/Immunologic: Negative for immunocompromised state.   Neurological: Negative for dizziness, seizures, syncope, facial asymmetry, speech difficulty, weakness, light-headedness, numbness and headaches.   Hematological: Does not bruise/bleed easily.   Psychiatric/Behavioral: Negative for agitation, behavioral problems, confusion, hallucinations and suicidal ideas.   All other systems reviewed and are negative.    Objective:     Vital Signs (Most Recent):  Temp: 97.8 °F (36.6 °C) (07/12/18 1547)  Pulse: 63 (07/12/18 1547)  Resp: 16 (07/12/18 1547)  BP: 124/62 (07/12/18 1547)  SpO2: (!) 94 % (07/12/18 1547) Vital Signs (24h  Range):  Temp:  [97.4 °F (36.3 °C)-98.9 °F (37.2 °C)] 97.8 °F (36.6 °C)  Pulse:  [53-79] 63  Resp:  [13-20] 16  SpO2:  [93 %-100 %] 94 %  BP: ()/(50-75) 124/62     Weight: 61.2 kg (135 lb)  Body mass index is 27.27 kg/m².    Intake/Output Summary (Last 24 hours) at 07/12/18 1808  Last data filed at 07/12/18 1359   Gross per 24 hour   Intake             1650 ml   Output               20 ml   Net             1630 ml      Physical Exam   Constitutional: She is oriented to person, place, and time. She appears well-developed and well-nourished. No distress.   HENT:   Head: Normocephalic and atraumatic.   Nose: Nose normal.   Eyes: Conjunctivae and EOM are normal. Pupils are equal, round, and reactive to light. No scleral icterus.   Neck: Normal range of motion. Neck supple. No tracheal deviation present.   Cardiovascular: Normal rate, regular rhythm, normal heart sounds and intact distal pulses.    No murmur heard.  Pulmonary/Chest: Effort normal and breath sounds normal. No stridor. No respiratory distress. She has no wheezes. She has no rales.   Abdominal: Soft. Bowel sounds are normal. She exhibits no distension. There is no tenderness. There is no guarding.   Musculoskeletal: Normal range of motion. She exhibits no edema, tenderness or deformity.   decreased rom Left ankle, LLE surgical dressing in place.      Neurological: She is alert and oriented to person, place, and time. She has normal reflexes. No cranial nerve deficit.   Skin: Skin is warm and dry. Capillary refill takes less than 2 seconds. No rash noted. She is not diaphoretic. No erythema.   See image     Psychiatric: She has a normal mood and affect. Her behavior is normal. Judgment and thought content normal.   Nursing note and vitals reviewed.      Significant Labs: All pertinent labs within the past 24 hours have been reviewed.    Significant Imaging:   Imaging Results          X-Ray Chest 1 View (Final result)  Result time 07/11/18 21:54:40     Final result by Tod Cotter Jr., MD (07/11/18 21:54:40)                 Impression:      No acute cardiopulmonary disease.  Cardiomegaly.      Electronically signed by: Tod Cotter Jr., MD  Date:    07/11/2018  Time:    21:54             Narrative:    EXAMINATION:  XR CHEST 1 VIEW    CLINICAL HISTORY:  pre op;    COMPARISON:  None    FINDINGS:  Lungs are clear.  Cardiac silhouette is enlarged.  There is air bubble that projects over the left heart consistent with hiatal hernia.  No effusion or pneumothorax.  Osseous structures are within normal limits.                               CT Ankle (Including Hindfoot) Without Contrast Left (Final result)  Result time 07/11/18 19:54:41    Final result by Tod Cotter Jr., MD (07/11/18 19:54:41)                 Impression:      1.  Widening at the calcaneal fracture site with evidence of loosening of the posterior screw.  There is ulceration of the soft tissues of the heel with gas at the fracture site.  Osteomyelitis not excluded.      Electronically signed by: Tod Cotter Jr., MD  Date:    07/11/2018  Time:    19:54             Narrative:    EXAMINATION:  CT ANKLE (INCLUDING HINDFOOT) WITHOUT CONTRAST LEFT    CLINICAL HISTORY:  Fracture, ankle;  Displaced avulsion fracture of tuberosity of left calcaneus, initial encounter for open fracture    TECHNIQUE:  CT scan was obtained of the left ankle without contrast.  Coronal and sagittal reconstructions were performed on these images.    COMPARISON:  06/18/2018.    FINDINGS:  Interval placement of 2 intact screws within the calcaneus in the interval transverse and a comminuted calcaneal fracture.  There is widening and gas within the fracture site of the body of the calcaneus.  The posterior screw demonstrates lucency around the head concerning for loosening.  There is ulceration of the soft tissues of the heel.  No erosive changes of the bone.                               X-Ray Calcaneus 2 View Left (Final  result)  Result time 07/11/18 18:47:15    Final result by Jimi Crespo Jr., MD (07/11/18 18:47:15)                 Impression:      Detrimental change in the calcaneal fracture.  Fracture widening in the interval, as above      Electronically signed by: Jimi Crespo MD  Date:    07/11/2018  Time:    18:47             Narrative:    EXAMINATION:  XR CALCANEUS 2 VIEW LEFT    CLINICAL HISTORY:  Unspecified complication of procedure, initial encounter    COMPARISON:  06/27/2018.    FINDINGS:  Detrimental change.  Interval widening of the previously seen fracture through the body of the calcaneus.  Separation of the fracture fragments by approximately 5 mm.  The 2 orthopedic screws remain in place.  Soft tissue swelling.

## 2018-07-12 NOTE — ED NOTES
"The pt has a c/o of "my voice went out and my ears started popping" pts voice is raspy but her lung sounds are clear.[facility] [place], [place] paged awaiting new orders.  No rash, redness or hives noted to the pts skin and pt denies difficulty breathing or swallowing. pts states her throat feels . Ancef was added to the pts allergy list  "

## 2018-07-12 NOTE — ED NOTES
Pt resting with eyes closed in no apparent distress. Bed lowest position, call light in reach and side rails up x2. Will continue to monitor.

## 2018-07-12 NOTE — SUBJECTIVE & OBJECTIVE
"Past Medical History:   Diagnosis Date    Acid reflux     Arthritis     Hiatal hernia        Past Surgical History:   Procedure Laterality Date    CARPAL TUNNEL RELEASE Right     CHOLECYSTECTOMY      COLONOSCOPY      ESOPHAGEAL DILATION      multiple times    HYSTERECTOMY      OPEN REDUCTION AND INTERNAL FIXATION (ORIF) OF INJURY OF FOOT Left 6/18/2018    Procedure: ORIF, FOOT;  Surgeon: Jim Maria MD;  Location: [facility];  Service: Orthopedics;  Laterality: Left;  Calcaneous    TONSILLECTOMY         Review of patient's allergies indicates:   Allergen Reactions    Codeine      "i dont like the funny feeling it gives me"       No current facility-administered medications on file prior to encounter.      Current Outpatient Prescriptions on File Prior to Encounter   Medication Sig    ascorbic acid, vitamin C, (VITAMIN C) 1000 MG tablet Take 1,000 mg by mouth.    aspirin (ECOTRIN) 81 MG EC tablet Take 1 tablet (81 mg total) by mouth once daily.    COMPOUND HORMONE REPLACEMENT Take 1 tablet by mouth once daily.    escitalopram oxalate (LEXAPRO) 20 MG tablet Take one and one half tablet daily    furosemide (LASIX) 40 MG tablet Take 40 mg by mouth.    magnesium 250 mg Tab Take by mouth.    multivitamin capsule Take 1 capsule by mouth once daily.    ondansetron (ZOFRAN-ODT) 4 MG TbDL Take 2 tablets (8 mg total) by mouth every 8 (eight) hours as needed.    oxyCODONE-acetaminophen (PERCOCET) 5-325 mg per tablet Take 1-2 tablets every 4-8 hrs as needed for pain.    pantoprazole (PROTONIX) 40 MG tablet Take 40 mg by mouth.    UNKNOWN TO PATIENT      Family History     Reviewed and not Pertinent           Social History Main Topics    Smoking status: Never Smoker    Smokeless tobacco: Never Used    Alcohol use Yes      Comment: on weekends    Drug use: No    Sexual activity: Not on file     Review of Systems   Constitutional: Negative for chills, diaphoresis, fatigue and fever.   HENT: Negative " for congestion, sore throat and voice change.    Eyes: Negative for photophobia and visual disturbance.   Respiratory: Negative for cough, shortness of breath, wheezing and stridor.    Cardiovascular: Negative for chest pain and leg swelling.   Gastrointestinal: Negative for abdominal distention, abdominal pain, constipation, diarrhea, nausea and vomiting.   Endocrine: Negative for polydipsia, polyphagia and polyuria.   Genitourinary: Negative for difficulty urinating, dysuria, flank pain, pelvic pain, urgency and vaginal discharge.   Musculoskeletal: Positive for arthralgias. Negative for back pain, joint swelling, neck pain and neck stiffness.   Skin: Positive for wound. Negative for color change and rash.   Allergic/Immunologic: Negative for immunocompromised state.   Neurological: Negative for dizziness, syncope, weakness, numbness and headaches.   Hematological: Does not bruise/bleed easily.   Psychiatric/Behavioral: Negative for agitation, behavioral problems and confusion.     Objective:     Vital Signs (Most Recent):  Temp: 98.9 °F (37.2 °C) (07/11/18 1751)  Pulse: 72 (07/11/18 1751)  Resp: 16 (07/11/18 1751)  BP: 126/69 (07/11/18 1751)  SpO2: 95 % (07/11/18 1751) Vital Signs (24h Range):  Temp:  [98.9 °F (37.2 °C)] 98.9 °F (37.2 °C)  Pulse:  [72] 72  Resp:  [16] 16  SpO2:  [95 %] 95 %  BP: (126)/(69) 126/69     Weight: 61.2 kg (135 lb)  Body mass index is 27.27 kg/m².    Physical Exam   Constitutional: She is oriented to person, place, and time. She appears well-developed and well-nourished. No distress.   HENT:   Head: Normocephalic and atraumatic.   Nose: Nose normal.   Eyes: Conjunctivae and EOM are normal. Pupils are equal, round, and reactive to light. No scleral icterus.   Neck: Normal range of motion. Neck supple. No tracheal deviation present.   Cardiovascular: Normal rate, regular rhythm, normal heart sounds and intact distal pulses.    No murmur heard.  Pulmonary/Chest: Effort normal and breath  sounds normal. No stridor. No respiratory distress. She has no wheezes. She has no rales.   Abdominal: Soft. Bowel sounds are normal. She exhibits no distension. There is no tenderness. There is no guarding.   Musculoskeletal: Normal range of motion. She exhibits no edema or deformity.   decreased rom Left ankle     Neurological: She is alert and oriented to person, place, and time. No cranial nerve deficit.   Skin: Skin is warm and dry. Capillary refill takes less than 2 seconds. No rash noted. She is not diaphoretic.   See image     Psychiatric: She has a normal mood and affect. Her behavior is normal. Judgment and thought content normal.   Nursing note and vitals reviewed.    Left heal           Significant Labs: All pertinent labs within the past 24 hours have been reviewed.  Results for orders placed or performed during the hospital encounter of 07/11/18   CBC auto differential   Result Value Ref Range    WBC 11.18 3.90 - 12.70 K/uL    RBC 4.74 4.00 - 5.40 M/uL    Hemoglobin 16.0 12.0 - 16.0 g/dL    Hematocrit 44.7 37.0 - 48.5 %    MCV 94 82 - 98 fL    MCH 33.8 (H) 27.0 - 31.0 pg    MCHC 35.8 32.0 - 36.0 g/dL    RDW 13.2 11.5 - 14.5 %    Platelets 219 150 - 350 K/uL    MPV 10.5 9.2 - 12.9 fL    Gran # (ANC) 8.5 (H) 1.8 - 7.7 K/uL    Lymph # 1.7 1.0 - 4.8 K/uL    Mono # 0.9 0.3 - 1.0 K/uL    Eos # 0.1 0.0 - 0.5 K/uL    Baso # 0.02 0.00 - 0.20 K/uL    Gran% 75.5 (H) 38.0 - 73.0 %    Lymph% 15.4 (L) 18.0 - 48.0 %    Mono% 7.8 4.0 - 15.0 %    Eosinophil% 1.1 0.0 - 8.0 %    Basophil% 0.2 0.0 - 1.9 %    Differential Method Automated    Basic metabolic panel   Result Value Ref Range    Sodium 139 136 - 145 mmol/L    Potassium 4.4 3.5 - 5.1 mmol/L    Chloride 105 95 - 110 mmol/L    CO2 22 (L) 23 - 29 mmol/L    Glucose 89 70 - 110 mg/dL    BUN, Bld 25 (H) 8 - 23 mg/dL    Creatinine 1.2 0.5 - 1.4 mg/dL    Calcium 9.2 8.7 - 10.5 mg/dL    Anion Gap 12 8 - 16 mmol/L    eGFR if African American 53 (A) >60 mL/min/1.73 m^2     eGFR if non African American 46 (A) >60 mL/min/1.73 m^2       Significant Imaging: I have reviewed all pertinent imaging results/findings within the past 24 hours.   Imaging Results          CT Ankle (Including Hindfoot) Without Contrast Left (Final result)  Result time 07/11/18 19:54:41    Final result by Tod Cotter Jr., MD (07/11/18 19:54:41)                 Impression:      1.  Widening at the calcaneal fracture site with evidence of loosening of the posterior screw.  There is ulceration of the soft tissues of the heel with gas at the fracture site.  Osteomyelitis not excluded.      Electronically signed by: Tod Cotter Jr., MD  Date:    07/11/2018  Time:    19:54             Narrative:    EXAMINATION:  CT ANKLE (INCLUDING HINDFOOT) WITHOUT CONTRAST LEFT    CLINICAL HISTORY:  Fracture, ankle;  Displaced avulsion fracture of tuberosity of left calcaneus, initial encounter for open fracture    TECHNIQUE:  CT scan was obtained of the left ankle without contrast.  Coronal and sagittal reconstructions were performed on these images.    COMPARISON:  06/18/2018.    FINDINGS:  Interval placement of 2 intact screws within the calcaneus in the interval transverse and a comminuted calcaneal fracture.  There is widening and gas within the fracture site of the body of the calcaneus.  The posterior screw demonstrates lucency around the head concerning for loosening.  There is ulceration of the soft tissues of the heel.  No erosive changes of the bone.                               X-Ray Calcaneus 2 View Left (Final result)  Result time 07/11/18 18:47:15    Final result by Jimi Crespo Jr., MD (07/11/18 18:47:15)                 Impression:      Detrimental change in the calcaneal fracture.  Fracture widening in the interval, as above      Electronically signed by: Jimi Crespo MD  Date:    07/11/2018  Time:    18:47             Narrative:    EXAMINATION:  XR CALCANEUS 2 VIEW LEFT    CLINICAL  HISTORY:  Unspecified complication of procedure, initial encounter    COMPARISON:  06/27/2018.    FINDINGS:  Detrimental change.  Interval widening of the previously seen fracture through the body of the calcaneus.  Separation of the fracture fragments by approximately 5 mm.  The 2 orthopedic screws remain in place.  Soft tissue swelling.

## 2018-07-12 NOTE — ANESTHESIA PROCEDURE NOTES
Peripheral    Patient location during procedure: post-op    Start time: 7/12/2018 2:35 PM  Timeout: 7/12/2018 2:32 PM   End time: 7/12/2018 2:44 PM  Surgery related to: calcaneous repair  Staffing  Anesthesiologist: ENDER SMALLWOOD  Preanesthetic Checklist  Completed: patient identified, site marked, surgical consent, pre-op evaluation, timeout performed, IV checked, risks and benefits discussed and monitors and equipment checked  Peripheral Block  Patient position: supine  Prep: ChloraPrep  Patient monitoring: heart rate, cardiac monitor, continuous pulse ox and frequent blood pressure checks  Block type: popliteal  Laterality: left  Injection technique: single shot  Needle  Needle type: Stimuplex   Needle gauge: 21 G  Needle length: 4 in  Needle localization: ultrasound guidance     Assessment  Injection assessment: negative aspiration, local visualized surrounding nerve and positive parasthesia  Paresthesia pain: immediately resolved  Heart rate change: no  Medications:  Bolus administered: 30 mL of 0.5 ropivacaine

## 2018-07-12 NOTE — ANESTHESIA RELEASE NOTE
"Anesthesia Release from PACU Note    Patient: Nayana Bledsoe    Procedure(s) Performed: Procedure(s) (LRB):  ORIF, FOOT , ORIF CALCANEUS FRACTURE (Left)  IRRIGATION AND DEBRIDEMENT, LOWER EXTREMITY (Left)  APPLICATION, DRESSING, WOUND (Left)    Anesthesia type: general    Post pain: Adequate analgesia    Post assessment: no apparent anesthetic complications, tolerated procedure well and no evidence of recall    Last Vitals:   Visit Vitals  /66 (BP Location: Right arm, Patient Position: Lying)   Pulse 71   Temp 36.5 °C (97.7 °F) (Oral)   Resp 17   Ht 4' 11" (1.499 m)   Wt 61.2 kg (135 lb)   SpO2 100%   Breastfeeding? No   BMI 27.27 kg/m²       Post vital signs: stable    Level of consciousness: responds to stimulation    Nausea/Vomiting: no nausea/no vomiting    Complications: none    Airway Patency: patent    Respiratory: unassisted, spontaneous ventilation, face mask    Cardiovascular: stable and blood pressure at baseline    Hydration: euvolemic  "

## 2018-07-12 NOTE — PROGRESS NOTES
Vancomycin Progress Notes:    Dx: SSTI w/ probable Osteomyelitis  Estimated Creatinine Clearance: 62.3 mL/min (based on SCr of 0.8 mg/dL).  white blood cell count = 8.92  Tmax/24h = 98.9 F  Cultures -no micro ordered  Serum creatinine improved today to 0.8 from 1.2 yesterday  Ordered a now random for 1515 so can give another dose if needed   Will keep goal trough of 15-20 until osteomyelitis Dx RO   Expect to be able to repeat dose with random level and change freq to [id number] for future dosing /Ashley Mckeon [facility] 7/12/2018 2:32 PM

## 2018-07-12 NOTE — PLAN OF CARE
Problem: Patient Care Overview  Goal: Plan of Care Review  Outcome: Ongoing (interventions implemented as appropriate)  Plan of care reviewed with patient; verbalized understanding. Fall precautions maintained, patient remains free from injury. Pain beng managed appropriately. Medications being administered as ordered. Vital signs stable with no signs of distress noted. Bed low and locked with call light in reach. Will continue monitor.

## 2018-07-12 NOTE — ED NOTES
Pt AAOx3, resting in bed, side rails up x 2, call bell within reach. NAD at this time.  at bedside.

## 2018-07-12 NOTE — OP NOTE
"Operative Note       Surgery Date: 7/12/2018     Surgeon(s) and Role:     * Jim Maria MD - Primary     * Maurice Moore MD - Assisting    Pre-op Diagnosis:  Open displaced avulsion fracture of tuberosity of left calcaneus, initial encounter [S92.032B]    Post-op Diagnosis: Same    Procedure(s) (LRB):      1. Irrigation and debridement of open left calcaneus fracture with debridement of 7 cm x 1 cm wound including debridement of skin, subcutaneous tissue and bone  2. Open reduction and internal fixation displaced calcaneal tuberosity fracture  3. Open tendo achilles lengthening left achilles tendon  4. Application of incisional wound vac to left heel  5. Application of short leg plaster splint left lower extremity    Anesthesia: General    Estimated Blood Loss: 10 cc           Specimens: None    Implants:     Implant Name Type Inv. Item Serial No.  Lot No. LRB No. Used   6.5x40mm canulated screw      Left 1   SCREW DALIA 4.5MM D 40M - [id number]  SCREW DALIA 4.5MM D 40M  [employer]  Left 1   6.5mm cancculated screw 40mm      Left 1   SCREW 4.5MM DALIA 46MM FTHD - [id number]   SCREW 4.5MM DALIA 46MM FTHD   [employer]   Left 1       OPERATIVE INDICATIONS: Nayana Bledsoe is a 73 y.o. female who sustained prior closed left calcaneus "tongue type" fracture with avulsion of the calcaneus and intra articular extension to the posterior facet. She underwent open reduction and internal fixation on 6/18/2018 and was doing well until she sustained a fall at her home. She then presented to the ED with open displaced fracture of the left calcaneus. The more anterior screw and reduction of the posterior face was intact, but she sustained a new fracture of the more posterior portion of the calcaneus. Preoperative discussion was held, and the indications, potential benefits, alternatives and risks of recommended I and D and ORIF of the left calcaneus, wound vac application were discussed. Risks including but not " limited to infection, damage to blood vessels and nerves, damage to tendons, bleeding, blood clot, risks of anesthesia, failure of fixation, post traumatic arthritis, wound breakdown, and potential need for more surgery all discussed. Patient and family expressed good understanding and wished to proceed, no guarantees given nor implied.       OPERATIVE DETAILS:     The patient was brought to the operating room and general anesthesia was induced without incident. The patient was positioned in the lateral decubitus position with the left side up, all bony prominences well padded. Padded peroneal nerve. The left lower extremity was prepped and draped in normal sterile fashion. Betadine prep used given open wound. Timeout was performed identifying proper patient, proper procedure and proper extremity and site, all in the room agreed. She received 2 g of ancef before skin incision. The traumatic wound was identified. The skin edges were sharply debrided with Metzenbaum scissors, debriding the traumatized skin edges and freshening the wound edges.. Every effort was made to protect the sural nerve at all times. Meticulous soft tissue handling was adhered to to minimize the risk of soft tissue compromise or wound breakdown. The fracture was identified and fracture hematoma was encountered and debrided. There was no gross contamination. 6 litres of normal saline with medium pressure high flow cysto tubing was used to irrigate the wound and the fracture site. The posterior screw previously placed was loose and was removed. The more anterior screw still had good purchase, was felt to be providing good fixation and reduction of the more anterior aspect of the previously fixed fracture and was left in place. Point to point clamp was used to carefully reduce the fracture and this interdigitated nicely. This was checked on the lateral and Noel views and looked to be appropriate. One 6.5 partially threaded stainless steel [employer]  "screws were placed bi cortically perpendicular to the fracture plane, through small central split through the achilles tendon. This was placed slightly medial to lateral based on preoperative CT planning and evaluation of bone stock, and had excellent purchase. Countersink rasp was used prior to screw placement to minimize screw prominence. A more posterior fully threaded screw was placed in a "home run" type fashion to provide another plane of fixation, again countersunk slightly to minimize prominence. This had excellent purchase. Reduction forceps was removed and there was good maintenance of the fracture. With 5 degrees of dorsiflexion there was no gapping at the fracture. Tendo achilles lenghtening was then performed starting just proximal to the achilles insertion. The medial aspect of the achilles was partially released, hemisection medially performed with 15 blade. 2 cm more proximal a lateral hemisection of the achilles tendon was performed. 2 cm more proximal the final hemisection was performed medially. Gentle dorsiflexion resulted in noticeably less tension at the achilles insertion, and the integrity of the achilles tendon was maintained. Garcia test remained normal. The wound was copiously irrigated with saline. Hemostasis was achieved. The wound was closed very meticulously with prolene trauma sutures. The wound approximated well with no noticeable tension. Incisional wound vac was placed to assist in wound healing and minimize risk for wound breakdown. This was for approximately 7 cm incision / wound.  Short leg splint with the foot at 5 - 10 degrees of dorsiflexion was placed, well padded. Patient tolerated the procedure well with no complications.  All sponge counts were correct at the end the procedure. Posterior mold splint was applied.  There was neutral flexion to the ankle. Splint was very well padded. She was transferred to the PACU in stable condition.       "

## 2018-07-12 NOTE — HOSPITAL COURSE
7/12/18 S/P ORIF of the left calcaneus and wound vac placement per Dr. Maria. Patient tolerated the procedure well.   Care discussed with Dr. Salomon who did not feel the pt had infection or osteomyelitis. No pus seen during surgical repair.   Dr. Salomon stated the pt could be discharged today.  recommended Doxycycline and home home health.   Pt instructed on NWB LLE until otherwise directed by orthopedics.

## 2018-07-12 NOTE — PLAN OF CARE
Report received from kwabena viramontes rn. Pt able to move self from stretcher to stretcher without difficulty. Escorted to preop via stretcher and accompanied by rn and family.

## 2018-07-12 NOTE — ED NOTES
The pt states her voice is back to normal. Her voice sounds louder and stronger. The pt continues to report feelings of restlessness. Pt in bed watching tv. In no apparent distress. Bed lowest position, call light in reach and side rails up x2. Will continue to monitor.

## 2018-07-12 NOTE — ASSESSMENT & PLAN NOTE
CT report : 1.  Widening at the calcaneal fracture site with evidence of loosening of the posterior screw.  There is ulceration of the soft tissues of the heel with gas at the fracture site.  Osteomyelitis not excluded.  Cover with Vanc/zosyn  Monitor  Consider MRI pending course

## 2018-07-12 NOTE — TRANSFER OF CARE
"Anesthesia Transfer of Care Note    Patient: Nayana Bledsoe    Procedure(s) Performed: Procedure(s) (LRB):  ORIF, FOOT , ORIF CALCANEUS FRACTURE (Left)  IRRIGATION AND DEBRIDEMENT, LOWER EXTREMITY (Left)  APPLICATION, DRESSING, WOUND (Left)    Patient location: PACU    Anesthesia Type: general    Transport from OR: Transported from OR on room air with adequate spontaneous ventilation    Post pain: adequate analgesia    Post assessment: no apparent anesthetic complications and tolerated procedure well    Post vital signs: stable    Level of consciousness: awake, alert and oriented    Nausea/Vomiting: no nausea/vomiting    Complications: none    Transfer of care protocol was followed      Last vitals:   Visit Vitals  /66 (BP Location: Right arm, Patient Position: Lying)   Pulse 71   Temp 36.5 °C (97.7 °F) (Oral)   Resp 17   Ht 4' 11" (1.499 m)   Wt 61.2 kg (135 lb)   SpO2 100%   Breastfeeding? No   BMI 27.27 kg/m²     "

## 2018-07-13 VITALS
HEART RATE: 63 BPM | SYSTOLIC BLOOD PRESSURE: 129 MMHG | TEMPERATURE: 98 F | DIASTOLIC BLOOD PRESSURE: 68 MMHG | RESPIRATION RATE: 18 BRPM | BODY MASS INDEX: 27.21 KG/M2 | HEIGHT: 59 IN | WEIGHT: 135 LBS | OXYGEN SATURATION: 96 %

## 2018-07-13 DIAGNOSIS — M79.672 PAIN OF LEFT HEEL: Primary | ICD-10-CM

## 2018-07-13 PROBLEM — T81.89XA PROBLEM INVOLVING SURGICAL INCISION: Chronic | Status: ACTIVE | Noted: 2018-07-11

## 2018-07-13 LAB
ANION GAP SERPL CALC-SCNC: 8 MMOL/L
BASOPHILS # BLD AUTO: 0.01 K/UL
BASOPHILS NFR BLD: 0.1 %
BUN SERPL-MCNC: 13 MG/DL
CALCIUM SERPL-MCNC: 8.5 MG/DL
CHLORIDE SERPL-SCNC: 107 MMOL/L
CO2 SERPL-SCNC: 27 MMOL/L
CREAT SERPL-MCNC: 0.9 MG/DL
DIFFERENTIAL METHOD: ABNORMAL
EOSINOPHIL # BLD AUTO: 0 K/UL
EOSINOPHIL NFR BLD: 0 %
ERYTHROCYTE [DISTWIDTH] IN BLOOD BY AUTOMATED COUNT: 13.2 %
EST. GFR  (AFRICAN AMERICAN): >60 ML/MIN/1.73 M^2
EST. GFR  (NON AFRICAN AMERICAN): >60 ML/MIN/1.73 M^2
GLUCOSE SERPL-MCNC: 110 MG/DL
HCT VFR BLD AUTO: 37.9 %
HGB BLD-MCNC: 13.2 G/DL
LYMPHOCYTES # BLD AUTO: 1.8 K/UL
LYMPHOCYTES NFR BLD: 10.8 %
MCH RBC QN AUTO: 33.1 PG
MCHC RBC AUTO-ENTMCNC: 34.8 G/DL
MCV RBC AUTO: 95 FL
MONOCYTES # BLD AUTO: 1.2 K/UL
MONOCYTES NFR BLD: 7.3 %
NEUTROPHILS # BLD AUTO: 13.2 K/UL
NEUTROPHILS NFR BLD: 81.8 %
PLATELET # BLD AUTO: 194 K/UL
PMV BLD AUTO: 10.6 FL
POTASSIUM SERPL-SCNC: 3.3 MMOL/L
RBC # BLD AUTO: 3.99 M/UL
SODIUM SERPL-SCNC: 142 MMOL/L
WBC # BLD AUTO: 16.17 K/UL

## 2018-07-13 PROCEDURE — 97116 GAIT TRAINING THERAPY: CPT

## 2018-07-13 PROCEDURE — 36415 COLL VENOUS BLD VENIPUNCTURE: CPT

## 2018-07-13 PROCEDURE — G0378 HOSPITAL OBSERVATION PER HR: HCPCS

## 2018-07-13 PROCEDURE — 63600175 PHARM REV CODE 636 W HCPCS: Performed by: PHYSICIAN ASSISTANT

## 2018-07-13 PROCEDURE — 97162 PT EVAL MOD COMPLEX 30 MIN: CPT

## 2018-07-13 PROCEDURE — 25000003 PHARM REV CODE 250: Performed by: ORTHOPAEDIC SURGERY

## 2018-07-13 PROCEDURE — 25000003 PHARM REV CODE 250: Performed by: NURSE PRACTITIONER

## 2018-07-13 PROCEDURE — 80048 BASIC METABOLIC PNL TOTAL CA: CPT

## 2018-07-13 PROCEDURE — G8988 SELF CARE GOAL STATUS: HCPCS | Mod: CJ

## 2018-07-13 PROCEDURE — G8987 SELF CARE CURRENT STATUS: HCPCS | Mod: CK

## 2018-07-13 PROCEDURE — 94760 N-INVAS EAR/PLS OXIMETRY 1: CPT

## 2018-07-13 PROCEDURE — 94799 UNLISTED PULMONARY SVC/PX: CPT

## 2018-07-13 PROCEDURE — 85025 COMPLETE CBC W/AUTO DIFF WBC: CPT

## 2018-07-13 PROCEDURE — 25000003 PHARM REV CODE 250: Performed by: PHYSICIAN ASSISTANT

## 2018-07-13 PROCEDURE — G8978 MOBILITY CURRENT STATUS: HCPCS | Mod: CI

## 2018-07-13 PROCEDURE — 63600175 PHARM REV CODE 636 W HCPCS: Performed by: ORTHOPAEDIC SURGERY

## 2018-07-13 PROCEDURE — G8979 MOBILITY GOAL STATUS: HCPCS | Mod: CH

## 2018-07-13 PROCEDURE — 97165 OT EVAL LOW COMPLEX 30 MIN: CPT

## 2018-07-13 RX ORDER — CHLORHEXIDINE GLUCONATE ORAL RINSE 1.2 MG/ML
10 SOLUTION DENTAL 2 TIMES DAILY
Status: DISCONTINUED | OUTPATIENT
Start: 2018-07-13 | End: 2018-07-13 | Stop reason: HOSPADM

## 2018-07-13 RX ORDER — POLYETHYLENE GLYCOL 3350 17 G/17G
17 POWDER, FOR SOLUTION ORAL DAILY
Qty: 527 G | Refills: 0 | Status: SHIPPED | OUTPATIENT
Start: 2018-07-13

## 2018-07-13 RX ORDER — ONDANSETRON 4 MG/1
8 TABLET, ORALLY DISINTEGRATING ORAL EVERY 8 HOURS PRN
Qty: 30 TABLET | Refills: 0 | Status: SHIPPED | OUTPATIENT
Start: 2018-07-13

## 2018-07-13 RX ORDER — POTASSIUM CHLORIDE 20 MEQ/1
40 TABLET, EXTENDED RELEASE ORAL ONCE
Status: COMPLETED | OUTPATIENT
Start: 2018-07-13 | End: 2018-07-13

## 2018-07-13 RX ORDER — POTASSIUM CHLORIDE 20 MEQ/1
20 TABLET, EXTENDED RELEASE ORAL ONCE
Status: DISCONTINUED | OUTPATIENT
Start: 2018-07-13 | End: 2018-07-13 | Stop reason: HOSPADM

## 2018-07-13 RX ORDER — OXYCODONE AND ACETAMINOPHEN 5; 325 MG/1; MG/1
TABLET ORAL
Qty: 30 TABLET | Refills: 0 | Status: SHIPPED | OUTPATIENT
Start: 2018-07-13

## 2018-07-13 RX ORDER — DOCUSATE SODIUM 100 MG/1
100 CAPSULE, LIQUID FILLED ORAL 2 TIMES DAILY
Refills: 0 | COMMUNITY
Start: 2018-07-13 | End: 2018-07-27

## 2018-07-13 RX ORDER — DOXYCYCLINE HYCLATE 100 MG
100 TABLET ORAL 2 TIMES DAILY
Qty: 14 TABLET | Refills: 0 | Status: SHIPPED | OUTPATIENT
Start: 2018-07-13 | End: 2018-07-16

## 2018-07-13 RX ADMIN — CIPROFLOXACIN AND DEXAMETHASONE 4 DROP: 3; 1 SUSPENSION/ DROPS AURICULAR (OTIC) at 08:07

## 2018-07-13 RX ADMIN — FUROSEMIDE 40 MG: 40 TABLET ORAL at 08:07

## 2018-07-13 RX ADMIN — PANTOPRAZOLE SODIUM 40 MG: 40 TABLET, DELAYED RELEASE ORAL at 08:07

## 2018-07-13 RX ADMIN — ENOXAPARIN SODIUM 40 MG: 100 INJECTION SUBCUTANEOUS at 08:07

## 2018-07-13 RX ADMIN — PIPERACILLIN SODIUM AND TAZOBACTAM SODIUM 4.5 G: 4; .5 INJECTION, POWDER, LYOPHILIZED, FOR SOLUTION INTRAVENOUS at 08:07

## 2018-07-13 RX ADMIN — Medication 2 MG: at 10:07

## 2018-07-13 RX ADMIN — CHLORHEXIDINE GLUCONATE 10 ML: 1.2 RINSE ORAL at 08:07

## 2018-07-13 RX ADMIN — POTASSIUM CHLORIDE 40 MEQ: 1500 TABLET, EXTENDED RELEASE ORAL at 11:07

## 2018-07-13 NOTE — PLAN OF CARE
Appropriate discharge paperwork faxed to [facility] via [facility]     Per [facility] declined patient for admission.    Referral faxed to [facility] and [facility]. Will wait for determination.    [facility]  Accepted patient. Updated patient with name of home health agency

## 2018-07-13 NOTE — SUBJECTIVE & OBJECTIVE
[place] of Systems   Constitutional: Negative for activity change, appetite change, chills, diaphoresis, fatigue, fever and unexpected weight change.   HENT: Negative for congestion, drooling, facial swelling, rhinorrhea, sinus pressure, sneezing, sore throat and voice change.    Eyes: Negative for photophobia, discharge, redness, itching and visual disturbance.   Respiratory: Negative for apnea, cough, choking, chest tightness, shortness of breath, wheezing and stridor.    Cardiovascular: Negative for chest pain, palpitations and leg swelling.   Gastrointestinal: Negative for abdominal distention, abdominal pain, anal bleeding, blood in stool, constipation, diarrhea, nausea and vomiting.   Endocrine: Negative for polydipsia, polyphagia and polyuria.   Genitourinary: Negative for decreased urine volume, difficulty urinating, dysuria, flank pain, frequency, hematuria, pelvic pain, urgency, vaginal bleeding and vaginal discharge.   Musculoskeletal: Positive for arthralgias. Negative for back pain, gait problem, joint swelling, myalgias, neck pain and neck stiffness.   Skin: Positive for wound. Negative for color change, pallor and rash.   Allergic/Immunologic: Negative for immunocompromised state.   Neurological: Negative for dizziness, seizures, syncope, facial asymmetry, speech difficulty, weakness, light-headedness, numbness and headaches.   Hematological: Does not bruise/bleed easily.   Psychiatric/Behavioral: Negative for agitation, behavioral problems, confusion, hallucinations and suicidal ideas.   All other systems reviewed and are negative.    Objective:     Vital Signs (Most Recent):  Temp: 97.5 °F (36.4 °C) (07/13/18 1220)  Pulse: 63 (07/13/18 1220)  Resp: 18 (07/13/18 1220)  BP: 129/68 (07/13/18 1220)  SpO2: 96 % (07/13/18 1220) Vital Signs (24h Range):  Temp:  [97.4 °F (36.3 °C)-98.5 °F (36.9 °C)] 97.5 °F (36.4 °C)  Pulse:  [61-65] 63  Resp:  [16-18] 18  SpO2:  [92 %-96 %] 96 %  BP: (109-129)/(55-68)  129/68     Weight: 61.2 kg (135 lb)  Body mass index is 27.27 kg/m².    Intake/Output Summary (Last 24 hours) at 07/13/18 1614  Last data filed at 07/13/18 0826   Gross per 24 hour   Intake          2334.58 ml   Output              400 ml   Net          1934.58 ml      Physical Exam   Constitutional: She is oriented to person, place, and time. She appears well-developed and well-nourished. No distress.   HENT:   Head: Normocephalic and atraumatic.   Nose: Nose normal.   Eyes: Conjunctivae and EOM are normal. Pupils are equal, round, and reactive to light. No scleral icterus.   Neck: Normal range of motion. Neck supple. No tracheal deviation present.   Cardiovascular: Normal rate, regular rhythm, normal heart sounds and intact distal pulses.    No murmur heard.  Pulmonary/Chest: Effort normal and breath sounds normal. No stridor. No respiratory distress. She has no wheezes. She has no rales.   Abdominal: Soft. Bowel sounds are normal. She exhibits no distension. There is no tenderness. There is no guarding.   Musculoskeletal: Normal range of motion. She exhibits no edema, tenderness or deformity.   decreased rom Left ankle, LLE surgical dressing in place.      Neurological: She is alert and oriented to person, place, and time. She has normal reflexes. No cranial nerve deficit.   Skin: Skin is warm and dry. Capillary refill takes less than 2 seconds. No rash noted. She is not diaphoretic. No erythema.   See image     Psychiatric: She has a normal mood and affect. Her behavior is normal. Judgment and thought content normal.   Nursing note and vitals reviewed.      Significant Labs: All pertinent labs within the past 24 hours have been reviewed.    Significant Imaging:   Imaging Results          X-Ray Chest 1 View (Final result)  Result time 07/11/18 21:54:40    Final result by Tod Cotter Jr., MD (07/11/18 21:54:40)                 Impression:      No acute cardiopulmonary disease.   Cardiomegaly.      Electronically signed by: Tod Cotter Jr., MD  Date:    07/11/2018  Time:    21:54             Narrative:    EXAMINATION:  XR CHEST 1 VIEW    CLINICAL HISTORY:  pre op;    COMPARISON:  None    FINDINGS:  Lungs are clear.  Cardiac silhouette is enlarged.  There is air bubble that projects over the left heart consistent with hiatal hernia.  No effusion or pneumothorax.  Osseous structures are within normal limits.                               CT Ankle (Including Hindfoot) Without Contrast Left (Final result)  Result time 07/11/18 19:54:41    Final result by Tod Cotter Jr., MD (07/11/18 19:54:41)                 Impression:      1.  Widening at the calcaneal fracture site with evidence of loosening of the posterior screw.  There is ulceration of the soft tissues of the heel with gas at the fracture site.  Osteomyelitis not excluded.      Electronically signed by: Tod Cotter Jr., MD  Date:    07/11/2018  Time:    19:54             Narrative:    EXAMINATION:  CT ANKLE (INCLUDING HINDFOOT) WITHOUT CONTRAST LEFT    CLINICAL HISTORY:  Fracture, ankle;  Displaced avulsion fracture of tuberosity of left calcaneus, initial encounter for open fracture    TECHNIQUE:  CT scan was obtained of the left ankle without contrast.  Coronal and sagittal reconstructions were performed on these images.    COMPARISON:  06/18/2018.    FINDINGS:  Interval placement of 2 intact screws within the calcaneus in the interval transverse and a comminuted calcaneal fracture.  There is widening and gas within the fracture site of the body of the calcaneus.  The posterior screw demonstrates lucency around the head concerning for loosening.  There is ulceration of the soft tissues of the heel.  No erosive changes of the bone.                               X-Ray Calcaneus 2 View Left (Final result)  Result time 07/11/18 18:47:15    Final result by Jimi Matthews Jr., MD (07/11/18 18:47:15)                 Impression:       Detrimental change in the calcaneal fracture.  Fracture widening in the interval, as above      Electronically signed by: Yadira Crespo MD  Date:    07/11/2018  Time:    18:47             Narrative:    EXAMINATION:  XR CALCANEUS 2 VIEW LEFT    CLINICAL HISTORY:  Unspecified complication of procedure, initial encounter    COMPARISON:  06/27/2018.    FINDINGS:  Detrimental change.  Interval widening of the previously seen fracture through the body of the calcaneus.  Separation of the fracture fragments by approximately 5 mm.  The 2 orthopedic screws remain in place.  Soft tissue swelling.

## 2018-07-13 NOTE — PT/OT/SLP EVAL
Occupational Therapy   Evaluation    Name: Nayana Bledsoe  MRN: [id number]  Admitting Diagnosis:  Calcaneus fracture, left 1 Day Post-Op    Recommendations:     Discharge Recommendations: home (WITH 24 HR SPV UNTIL WB'ING PRECAUTIONS ARE LIFTED)  Discharge Equipment Recommendations:  none  Barriers to discharge:  None    History:     Occupational Profile:  Living Environment: PT LIVES WITH SPOUSE IN 1 STORY HOUSE, NO STEPS TO ENTER  Previous level of function: INDEPENDENT PRIOR TO FIRST SURGERY  Roles and Routines: DROVES PRIOR TO SURGERY  Equipment Owned:  walker, rolling, bath bench  Assistance upon Discharge: SPOUSE, FAMILY    Past Medical History:   Diagnosis Date    Acid reflux     Arthritis     Hiatal hernia        Past Surgical History:   Procedure Laterality Date    CARPAL TUNNEL RELEASE Right     CHOLECYSTECTOMY      COLONOSCOPY      ESOPHAGEAL DILATION      multiple times    HYSTERECTOMY      OPEN REDUCTION AND INTERNAL FIXATION (ORIF) OF INJURY OF FOOT Left 6/18/2018    Procedure: ORIF, FOOT;  Surgeon: Jim Maria MD;  Location: [facility];  Service: Orthopedics;  Laterality: Left;  Calcaneous    TONSILLECTOMY         Subjective     Chief Complaint: WEAKNESS  Patient/Family stated goals: RETURN HOME  Communicated with: NURSE prior to session.  Pain/Comfort:  · Pain Rating 1: 0/10  · Pain Rating Post-Intervention 1: 0/10 (REPORTS SLIGHT BURNING, BUT NO PAIN)    Patients cultural, spiritual, Catholic conflicts given the current situation:      Objective:     Patient found with: peripheral IV, telemetry    General Precautions: Standard, fall   Orthopedic Precautions:LLE non weight bearing   Braces: N/A     Occupational Performance:    Bed Mobility:    · Patient completed Rolling/Turning to Left with  modified independence  · Patient completed Scooting/Bridging with modified independence  · Patient completed Supine to Sit with modified independence  · Patient completed Sit to Supine with  "modified independence    Functional Mobility/Transfers:  · Patient completed Sit <> Stand Transfer with contact guard assistance  with  rolling walker   · Functional Mobility: PT AMBULATED WITH RW CGA-SPV FOLLOWING WBING PRECAUTIONS    Activities of Daily Living:  · Lower Body Dressing: supervision and stand by assistance RIGHT SOCK    Cognitive/Visual Perceptual:  Cognitive/Psychosocial Skills:     -       Oriented to: Person, Place, Time and Situation   -       Follows Commands/attention:Follows multistep  commands  -       Memory: No Deficits noted  -       Safety awareness/insight to disability: impaired     Physical Exam:  Balance:    -       FAIR+  Upper Extremity Range of Motion:     -       Right Upper Extremity: WFL  -       Left Upper Extremity: WFL  Upper Extremity Strength:    -       Right Upper Extremity: WFL  -       Left Upper Extremity: WFL    Patient left HOB elevated with all lines intact, call button in reach, NURSING notified and FAMILY present    [facility] 6 Click:  [facility] Total Score: 21    Treatment & Education:  PT PARTICIPATED IN INITIAL OT EVALUATION TODAY TO ASSESS CURRENT LEVEL OF FUNCTION. PLEASE SEE ABOVE FOR FINDINGS. PT WILL BENEFIT FROM SKILLED OT SERVICES UNTIL DISCHARGED TO INCREASE FUNCTIONAL STRENGTH AND INDEPENDENCE.  Education:    Assessment:     Nayana Bledsoe is a 73 y.o. female with a medical diagnosis of Calcaneus fracture, left.  She presents with DEBILITY, IMPAIRED ADLS, FUNCTIONAL MOBILITY AND SAFETY AWARENESS.  Performance deficits affecting function are weakness, impaired endurance, gait instability, impaired functional mobilty, impaired self care skills, impaired balance, decreased lower extremity function.      Rehab Prognosis:  GOOD; patient would benefit from acute skilled OT services to address these deficits and reach maximum level of function.         Clinical Decision Makin.  OT Low:  "Pt evaluation falls under low complexity for evaluation coding due to " "performance deficits noted in 1-3 areas as stated above and 0 co-morbities affecting current functional status. Data obtained from problem focused assessments. No modifications or assistance was required for completion of evaluation. Only brief occupational profile and history review completed."     Plan:     Patient to be seen 3 x/week to address the above listed problems via self-care/home management, therapeutic activities, therapeutic exercises  · Plan of Care Expires: 07/20/18  · Plan of Care Reviewed with: patient, family    This Plan of care has been discussed with the patient who was involved in its development and understands and is in agreement with the identified goals and treatment plan    GOALS:    Occupational Therapy Goals        Problem: Occupational Therapy Goal    Goal Priority Disciplines Outcome Interventions   Occupational Therapy Goal     OT, PT/OT     Description:  Goals to be met by: 7/20/18     Patient will increase functional independence with ADLs by performing:    LE Dressing with Contact Guard Assistance.  Toileting from toilet with Supervision for hygiene and clothing management.   Toilet transfer to toilet with Supervision.  Upper extremity exercise program x15 reps per handout, with supervision.                      Time Tracking:     OT Date of Treatment: 07/13/18  OT Start Time: 1100  OT Stop Time: 1115  OT Total Time (min): 15 min    Billable Minutes:Evaluation 15    Suzanna Rodriguez OT  7/13/2018    "

## 2018-07-13 NOTE — PLAN OF CARE
Problem: Patient Care Overview  Goal: Plan of Care Review  Outcome: Outcome(s) achieved Date Met: 07/13/18  Plan of care reviewed with patient; verbalized understanding. Fall precautions maintained, patient remains free from injury. Pain beng managed appropriately. Medications being administered as ordered. Dressing to LLE is clean, dry and intact. Portable wound vac in place; draining scant amount of bloody drainage. 2+ pulses to LLE. Vital signs stable with no signs of distress noted. Bed low and locked with call light in reach. Will continue monitor.

## 2018-07-13 NOTE — PROGRESS NOTES
Orthopedic Sports Surgery Rounding Note     2018    Patient was seen this morning with  at bedside. She has no new complaints. Her pain is controlled from peripheral block given postop.     No fever, chills, nausea, vomit, diarrhea, shortness-of-breath, chest pain.     Discussed treatment and discharge plans.    Vital Signs:    Vitals:    18 2315 18 0318 18 0735 18 0736   BP: (!) 111/59 (!) 109/59  (!) 115/55   BP Location: Left arm Left arm  Left arm   Patient Position: Lying Lying  Lying   Pulse: 64 61  63   Resp: 16 18  16   Temp: 98 °F (36.7 °C) 98.1 °F (36.7 °C)  98.5 °F (36.9 °C)   TempSrc: Oral Oral  Oral   SpO2: (!) 94% (!) 92% (!) 93% 95%   Weight:       Height:           Temp (48hrs), Av.2 °F (36.8 °C), Min:97.4 °F (36.3 °C), Max:98.9 °F (37.2 °C)      Recent Lab Results:    Recent Labs  Lab 18  0335 18  0424    137 142   K 4.4 3.9 3.3*    108 107   CO2 22* 22* 27   BUN 25* 18 13   CREATININE 1.2 0.8 0.9   GLU 89 134* 110   CALCIUM 9.2 8.6* 8.5*     Recent Labs      18   0335  18   0424   WBC  11.18  8.92  16.17*   HGB  16.0  14.8  13.2   HCT  44.7  41.4  37.9   PLT  219  201  194   INR   --   1.1   --          Physical Exam:  A/O *3. No acute distress    Left Lower Extremity  Dressing/Incision C/D/I  Sensation- patient unable to feel gentle palpation over dorsum of toes and plantar aspect of foot  Cap refill less than 2 sec  2+ DP pulse    Assessment:  S/P ORIF of Left calcaneus with Achilles tendon lengthening, Irrigation and debridement, Wound vac placement and splint application, POD # 1    Plan:  Pain Control with Percocet 5 for home and give one dose prior to d/c  DVT prophylaxis with ASA for home use  Left LE NWB for 12 weeks  Antibiotics -Zosyn, please given third dose today  Hwang:N/A  Patient instructed to f/u in clinic on Monday  Patient's nurse was in the room during our visit and was  made aware of these plans as well    Disposition: Pain control, DC when stable today     Tori Payton PA-C  Orthopedic Surgery

## 2018-07-13 NOTE — PT/OT/SLP EVAL
Physical Therapy Evaluation    Patient Name:  Nayana Bledsoe   MRN:  [id number]    Recommendations:     Discharge Recommendations:  home (24 HOUR CARE)   Discharge Equipment Recommendations: none   Barriers to discharge: None    Assessment:     Nayana Bledsoe is a 73 y.o. female admitted with a medical diagnosis of Calcaneus fracture, left.  She presents with the following impairments/functional limitations:  weakness, impaired endurance, impaired functional mobilty, gait instability, impaired balance, decreased ROM, decreased lower extremity function, impaired self care skills, orthopedic precautions .    Rehab Prognosis:  GOOD; patient would benefit from acute skilled PT services to address these deficits and reach maximum level of function.      Recent Surgery: Procedure(s) (LRB):  ORIF, FOOT , ORIF CALCANEUS FRACTURE (Left)  IRRIGATION AND DEBRIDEMENT, LOWER EXTREMITY (Left)  APPLICATION, DRESSING, WOUND (Left) 1 Day Post-Op    Plan:     During this hospitalization, patient to be seen   to address the above listed problems via gait training, therapeutic activities, therapeutic exercises  · Plan of Care Expires:  07/20/18   Plan of Care Reviewed with: patient, family    Subjective     Communicated with NURSE AND EPIC CHART REVIEW prior to session.  Patient found IN BED WITH  AND DAUGHTER PRESENT upon PT entry to room, agreeable to evaluation.      Chief Complaint: NONE STATED  Patient comments/goals: GO HOME   Pain/Comfort:  · Pain Rating 1: 0/10  · Pain Rating Post-Intervention 1: 0/10    Patients cultural, spiritual, Voodoo conflicts given the current situation:      Living Environment:  PT LIVES AT HOME WITH  IN A ONE STORY HOME WITH NO STEPS TO ENTER HOME   Prior to admission, patients level of function was MOD I WITH RW/ WC FOR LONGER DISTANCES NWB L LE.  Patient has the following equipment: walker, rolling, wheelchair, shower chair.  DME owned (not currently used): none.  Upon discharge,  patient will have assistance from FAMILY.    Objective:     Patient found with: telemetry, peripheral IV     General Precautions: Standard, fall   Orthopedic Precautions:LLE non weight bearing   Braces: N/A     Exams:  · RLE ROM: WNL  · RLE Strength: WNL  · LLE ROM: LIMITED  · LLE Strength: LIMITED    Functional Mobility:  · PT MET IN RM SUP>SIT EOB WITH SBA. PT STOOD WITH RW AND NWB L LE FOR GT TRAINING X 110' WITH CLOSE SBA. PT RETURNED TO RM T/F TO EOB WITH SBA. PT SUP IN BED WITH SBA AND LEFT WITH L LE ELEVATED AND ALL NEEDS MET. PT EDUCATED ON TE FOR MAINTAINING STRENGTH OF L LE. PT REPORTED UNDERSTANDING. PT LEFT WITH ALL NEEDS MET.     AM-PAC 6 CLICK MOBILITY  Total Score:21     Patient left supine with call button in reach and FAMILY present.    GOALS:    Physical Therapy Goals        Problem: Physical Therapy Goal    Goal Priority Disciplines Outcome Goal Variances Interventions   Physical Therapy Goal     PT/OT, PT      Description:  PT WILL BE SEEN FOR P.T. FOR A MIN OF 5 OUT OF 7 DAYS A WEEK  LT18  1. PT WILL COMPLETE BED MOBILITY IND  2. PT WILL T/F TO CHAIR MOD I WITH RW AND NWB L LE.  3. PT WILL GT TRAIN X 150' WITH RW MOD I  4. PT WILL COMPLETE B LE TE 20 REPS FOR STRENGTHENING.                     History:     Past Medical History:   Diagnosis Date    Acid reflux     Arthritis     Hiatal hernia        Past Surgical History:   Procedure Laterality Date    CARPAL TUNNEL RELEASE Right     CHOLECYSTECTOMY      COLONOSCOPY      ESOPHAGEAL DILATION      multiple times    HYSTERECTOMY      OPEN REDUCTION AND INTERNAL FIXATION (ORIF) OF INJURY OF FOOT Left 2018    Procedure: ORIF, FOOT;  Surgeon: Jim Maria MD;  [facility];  Service: Orthopedics;  Laterality: Left;  Calcaneous    TONSILLECTOMY         Clinical Decision Making:     History  Co-morbidities and personal factors that may impact the plan of care Examination  Body Structures and Functions, activity  limitations and participation restrictions that may impact the plan of care Clinical Presentation   Decision Making/ Complexity Score   Co-morbidities:   [] Time since onset of injury / illness / exacerbation  [] Status of current condition  []Patient's cognitive status and safety concerns    [] Multiple Medical Problems (see med hx)  Personal Factors:   [] Patient's age  [] Prior Level of function   [] Patient's home situation (environment and family support)  [] Patient's level of motivation  [] Expected progression of patient      HISTORY:(criteria)    [] 11042 - no personal factors/history    [] 68959 - has 1-2 personal factor/comorbidity     [] 88170 - has >3 personal factor/comorbidity     Body Regions:  [] Objective examination findings  [] Head     []  Neck  [] Trunk   [] Upper Extremity  [] Lower Extremity    Body Systems:  [] For communication ability, affect, cognition, language, and learning style: the assessment of the ability to make needs known, consciousness, orientation (person, place, and time), expected emotional /behavioral responses, and learning preferences (eg, learning barriers, education  needs)  [] For the neuromuscular system: a general assessment of gross coordinated movement (eg, balance, gait, locomotion, transfers, and transitions) and motor function  (motor control and motor learning)  [] For the musculoskeletal system: the assessment of gross symmetry, gross range of motion, gross strength, height, and weight  [] For the integumentary system: the assessment of pliability(texture), presence of scar formation, skin color, and skin integrity  [] For cardiovascular/pulmonary system: the assessment of heart rate, respiratory rate, blood pressure, and edema     Activity limitations:    [] Patient's cognitive status and saf ety concerns          [] Status of current condition      [] Weight bearing restriction  [] Cardiopulmunary Restriction    Participation Restrictions:   [] Goals and  goal agreement with the patient     [] Rehab potential (prognosis) and probable outcome      Examination of Body System: (criteria)    [] 65062 - addressing 1-2 elements    [] 21224 - addressing a total of 3 or more elements     [] 39560 -  Addressing a total of 4 or more elements         Clinical Presentation: (criteria)  Choose one     On examination of body system using standardized tests and measures patient presents with (CHOOSE ONE) elements from any of the following: body structures and functions, activity limitations, and/or participation restrictions.  Leading to a clinical presentation that is considered (CHOOSE ONE)                              Clinical Decision Making  (Eval Complexity):  Choose One     Time Tracking:     PT Received On: 07/13/18  PT Start Time: 1146     PT Stop Time: 1215  PT Total Time (min): 29 min     Billable Minutes: Evaluation 19 and Gait Training 10      Gris Dobbins, PT  07/13/2018

## 2018-07-13 NOTE — PLAN OF CARE
Met with patient and family. Patient denies any post hospital needs or services at this time. Per Danyell KAUR patient will need home health therapy.  Transitional Care Folder, Discharge Planning Begins on Admission pamphlet, [facility] Bedside Delivery pamphlet, Advance Directive information given to patient along with the contact information given.Instructed patient or family to call with any questions or concerns.    @ 1310 Met with patient again to discuss home health. Preference letter obtained. Referral faxed to Theresa .    Follow-up Information     Jim Maria MD. Schedule an appointment as soon as possible for a visit on 7/16/2018.    Specialty:  Orthopedic Surgery  Why:  For wound re-check  Contact information:  [address] [place] [address] [phone]             Jonatan Joyce Iii, MD In 3 days.    Specialty:  Internal Medicine  Contact information:  [address]  [address]  [place] [address]  [phone]                   [facility] [address] [place] [address] [place] [address]  [address] [address] [address]  Phone: [phone] Fax: [phone]    Jonatan Joyce Iii, MD  Payor: [employer] MEDICARE / Plan: [employer] MEDICARE HMO / Product Type: Capitation /           07/13/18 1210   Discharge Assessment   Assessment Type Discharge Planning Assessment   Confirmed/corrected address and phone number on facesheet? Yes   Assessment information obtained from? Patient;Medical Record   Expected Length of Stay (days) (tbd)   Communicated expected length of stay with patient/caregiver yes   Prior to hospitilization cognitive status: Alert/Oriented   Prior to hospitalization functional status: Assistive Equipment   Current cognitive status: Alert/Oriented   Current Functional Status: Assistive Equipment   Facility Arrived From: home   Lives With spouse   Able to Return to Prior Arrangements yes   Is patient able to care for self after discharge? Yes    Who are your caregiver(s) and their phone number(s)? Codey Bledsoe ( [phone] )    Patient's perception of discharge disposition home health   Readmission Within The Last 30 Days no previous admission in last 30 days   Patient currently being followed by outpatient case management? No   Patient currently receives any other outside agency services? No   Equipment Currently Used at Home bath bench;walker, rolling   Do you have any problems affording any of your prescribed medications? No   Is the patient taking medications as prescribed? yes   Does the patient have transportation home? Yes   Transportation Available car;family or friend will provide   Does the patient receive services at the [facility]? No   Discharge Plan A Home;Home with family   Discharge Plan B Home;Home with family;Home Health   Patient/Family In Agreement With Plan yes

## 2018-07-13 NOTE — PLAN OF CARE
Problem: Patient Care Overview  Goal: Plan of Care Review  Outcome: Ongoing (interventions implemented as appropriate)  Neurovascular checks Q4. Pt denied pain during shift. LLE elevated during shift. NWB to LLE. Pt prefers ambulating to bathroom with walker. VSS.   Fall precautions in place. Call light and personal items within reach. Educated patient on side effects of medication administered. Pt verbalized understanding. 24 hour order check done.  Will continue to monitor.

## 2018-07-13 NOTE — PLAN OF CARE
07/13/18 1210   CORNEJO Message   Medicare Outpatient and Observation Notification regarding financial responsibility Given to patient/caregiver;Explained to patient/caregiver;Signed/date by patient/caregiver   Date CORNEJO was signed 07/13/18   Time CORNEJO was signed 1210

## 2018-07-13 NOTE — ANESTHESIA POSTPROCEDURE EVALUATION
"Anesthesia Post Evaluation    Patient: Nayana Bledsoe    Procedure(s) Performed: Procedure(s) (LRB):  ORIF, FOOT , ORIF CALCANEUS FRACTURE (Left)  IRRIGATION AND DEBRIDEMENT, LOWER EXTREMITY (Left)  APPLICATION, DRESSING, WOUND (Left)    Final Anesthesia Type: general  Patient location during evaluation: PACU  Patient participation: Yes- Able to Participate  Level of consciousness: awake and alert and oriented  Post-procedure vital signs: reviewed and stable  Pain management: adequate  Airway patency: patent  PONV status at discharge: No PONV  Anesthetic complications: no      Cardiovascular status: hemodynamically stable  Respiratory status: unassisted, room air and spontaneous ventilation  Hydration status: euvolemic  Follow-up not needed.        Visit Vitals  /61 (Patient Position: Lying)   Pulse 65   Temp 36.3 °C (97.4 °F) (Oral)   Resp 16   Ht 4' 11" (1.499 m)   Wt 61.2 kg (135 lb)   SpO2 (!) 94%   Breastfeeding? No   BMI 27.27 kg/m²       Pain/Jody Score: Pain Assessment Performed: Yes (7/12/2018  7:10 PM)  Presence of Pain: denies (7/12/2018  7:10 PM)  Pain Rating Prior to Med Admin: 5 (7/12/2018  2:50 PM)  Jody Score: 10 (7/12/2018  3:15 PM)      "

## 2018-07-13 NOTE — PROGRESS NOTES
"[facility] Hospital Medicine  Progress Note    Patient Name: Nayana Bledsoe  MRN: [id number]  Patient Class: OP- Observation   Admission Date: 7/11/2018  Length of Stay: 0 days  Attending Physician: Dr. Lemon   Primary Care Provider: Jonatan Joyce Iii, MD        Subjective:     Principal Problem:Calcaneus fracture, left    HPI:  Nayana Bledsoe is a 73 y.o. female patient who presents for a wound check of left heal that opened after getting out of tub. Tonight just PTA, she reports that she slipped while getting out of the tub and reflexively stepped down abruptly onto her recently surgically repaired left foot to prevent a fall. She states that her surgical incision has "split open." . Patient reports she felt fine prior to incident today with no complaints. Associated symptoms include heal/ankle pain of Left. ROM of Left ankle worsens. Neurovascular is intact on exam. To note. pt is approximately 3 weeks S/P ORIF L calcaneous fx by Dr. Maria (Orthopedics). No associated or additional injuries or concerns. Case was discussed with ORTHO by ER who accepted patient with plan for surgical intervention tomorrow, hospital medicine consulted for medical management/surgical risk. Patient reports only significant medical history includes hyperlipidemia and Caroid stenosis.  Patient has never had MI, Stroke, denies HTN, DM or any other medical conditions. Preop testing ordered and pending.     Hospital Course:  7/12/18 S/P ORIF of the left calcaneus and wound vac placement per Dr. Maria. Patient tolerated the procedure well.   Care discussed with Dr. Salomon who did not feel the pt had infection or osteomyelitis. No pus seen during surgical repair.   Dr. Salomon stated the pt could be discharged today.  recommended Doxycycline and home home health.   Pt instructed on NWB LLE until otherwise directed by orthopedics.     IReview of Systems   Constitutional: Negative for activity change, appetite " change, chills, diaphoresis, fatigue, fever and unexpected weight change.   HENT: Negative for congestion, drooling, facial swelling, rhinorrhea, sinus pressure, sneezing, sore throat and voice change.    Eyes: Negative for photophobia, discharge, redness, itching and visual disturbance.   Respiratory: Negative for apnea, cough, choking, chest tightness, shortness of breath, wheezing and stridor.    Cardiovascular: Negative for chest pain, palpitations and leg swelling.   Gastrointestinal: Negative for abdominal distention, abdominal pain, anal bleeding, blood in stool, constipation, diarrhea, nausea and vomiting.   Endocrine: Negative for polydipsia, polyphagia and polyuria.   Genitourinary: Negative for decreased urine volume, difficulty urinating, dysuria, flank pain, frequency, hematuria, pelvic pain, urgency, vaginal bleeding and vaginal discharge.   Musculoskeletal: Positive for arthralgias. Negative for back pain, gait problem, joint swelling, myalgias, neck pain and neck stiffness.   Skin: Positive for wound. Negative for color change, pallor and rash.   Allergic/Immunologic: Negative for immunocompromised state.   Neurological: Negative for dizziness, seizures, syncope, facial asymmetry, speech difficulty, weakness, light-headedness, numbness and headaches.   Hematological: Does not bruise/bleed easily.   Psychiatric/Behavioral: Negative for agitation, behavioral problems, confusion, hallucinations and suicidal ideas.   All other systems reviewed and are negative.    Objective:     Vital Signs (Most Recent):  Temp: 97.5 °F (36.4 °C) (07/13/18 1220)  Pulse: 63 (07/13/18 1220)  Resp: 18 (07/13/18 1220)  BP: 129/68 (07/13/18 1220)  SpO2: 96 % (07/13/18 1220) Vital Signs (24h Range):  Temp:  [97.4 °F (36.3 °C)-98.5 °F (36.9 °C)] 97.5 °F (36.4 °C)  Pulse:  [61-65] 63  Resp:  [16-18] 18  SpO2:  [92 %-96 %] 96 %  BP: (109-129)/(55-68) 129/68     Weight: 61.2 kg (135 lb)  Body mass index is 27.27  kg/m².    Intake/Output Summary (Last 24 hours) at 07/13/18 1614  Last data filed at 07/13/18 0826   Gross per 24 hour   Intake          2334.58 ml   Output              400 ml   Net          1934.58 ml      Physical Exam   Constitutional: She is oriented to person, place, and time. She appears well-developed and well-nourished. No distress.   HENT:   Head: Normocephalic and atraumatic.   Nose: Nose normal.   Eyes: Conjunctivae and EOM are normal. Pupils are equal, round, and reactive to light. No scleral icterus.   Neck: Normal range of motion. Neck supple. No tracheal deviation present.   Cardiovascular: Normal rate, regular rhythm, normal heart sounds and intact distal pulses.    No murmur heard.  Pulmonary/Chest: Effort normal and breath sounds normal. No stridor. No respiratory distress. She has no wheezes. She has no rales.   Abdominal: Soft. Bowel sounds are normal. She exhibits no distension. There is no tenderness. There is no guarding.   Musculoskeletal: Normal range of motion. She exhibits no edema, tenderness or deformity.   decreased rom Left ankle, LLE surgical dressing in place.      Neurological: She is alert and oriented to person, place, and time. She has normal reflexes. No cranial nerve deficit.   Skin: Skin is warm and dry. Capillary refill takes less than 2 seconds. No rash noted. She is not diaphoretic. No erythema.   See image     Psychiatric: She has a normal mood and affect. Her behavior is normal. Judgment and thought content normal.   Nursing note and vitals reviewed.      Significant Labs: All pertinent labs within the past 24 hours have been reviewed.    Significant Imaging:   Imaging Results          X-Ray Chest 1 View (Final result)  Result time 07/11/18 21:54:40    Final result by Tod Cotter Jr., MD (07/11/18 21:54:40)                 Impression:      No acute cardiopulmonary disease.  Cardiomegaly.      Electronically signed by: Tod Cotter Jr.,  MD  Date:    07/11/2018  Time:    21:54             Narrative:    EXAMINATION:  XR CHEST 1 VIEW    CLINICAL HISTORY:  pre op;    COMPARISON:  None    FINDINGS:  Lungs are clear.  Cardiac silhouette is enlarged.  There is air bubble that projects over the left heart consistent with hiatal hernia.  No effusion or pneumothorax.  Osseous structures are within normal limits.                               CT Ankle (Including Hindfoot) Without Contrast Left (Final result)  Result time 07/11/18 19:54:41    Final result by Tod Cotter Jr., MD (07/11/18 19:54:41)                 Impression:      1.  Widening at the calcaneal fracture site with evidence of loosening of the posterior screw.  There is ulceration of the soft tissues of the heel with gas at the fracture site.  Osteomyelitis not excluded.      Electronically signed by: Tod Cotter Jr., MD  Date:    07/11/2018  Time:    19:54             Narrative:    EXAMINATION:  CT ANKLE (INCLUDING HINDFOOT) WITHOUT CONTRAST LEFT    CLINICAL HISTORY:  Fracture, ankle;  Displaced avulsion fracture of tuberosity of left calcaneus, initial encounter for open fracture    TECHNIQUE:  CT scan was obtained of the left ankle without contrast.  Coronal and sagittal reconstructions were performed on these images.    COMPARISON:  06/18/2018.    FINDINGS:  Interval placement of 2 intact screws within the calcaneus in the interval transverse and a comminuted calcaneal fracture.  There is widening and gas within the fracture site of the body of the calcaneus.  The posterior screw demonstrates lucency around the head concerning for loosening.  There is ulceration of the soft tissues of the heel.  No erosive changes of the bone.                               X-Ray Calcaneus 2 View Left (Final result)  Result time 07/11/18 18:47:15    Final result by Jimi Matthews Jr., MD (07/11/18 18:47:15)                 Impression:      Detrimental change in the calcaneal fracture.  Fracture  widening in the interval, as above      Electronically signed by: Yadira Crespo MD  Date:    07/11/2018  Time:    18:47             Narrative:    EXAMINATION:  XR CALCANEUS 2 VIEW LEFT    CLINICAL HISTORY:  Unspecified complication of procedure, initial encounter    COMPARISON:  06/27/2018.    FINDINGS:  Detrimental change.  Interval widening of the previously seen fracture through the body of the calcaneus.  Separation of the fracture fragments by approximately 5 mm.  The 2 orthopedic screws remain in place.  Soft tissue swelling.                                   Assessment/Plan:      * Calcaneus fracture, left    Ortho managing  Control pain  Monitor for complications  See CT result  Plan for surgical intervention tomorrow  1x Heparin SQ now for DVT prophylaxis  7/12/18 S/P left ORIF of Calcaneus, tolerated well.         Preop examination      Pending          Elevated BP without diagnosis of hypertension    Prehypertensive #  Monitor trends  Consider daily therapy pending course          Hyperlipidemia    No statin noted  Check lipid, a1c          Problem involving surgical incision    Ortho managing  Local wound care            VTE Risk Mitigation         Ordered     enoxaparin injection 40 mg  Daily      07/12/18 1741     Place sequential compression device  Until discontinued      07/12/18 1742     Place sequential compression device  Until discontinued      07/12/18 1552     Place sequential compression device  Until discontinued      07/11/18 2137     IP VTE LOW RISK PATIENT  Once      07/11/18 1844              Danyell Cleveland NP  Department of Hospital Medicine   [facility]

## 2018-07-14 ENCOUNTER — NURSE TRIAGE (OUTPATIENT)
Dept: ADMINISTRATIVE | Facility: CLINIC | Age: 73
End: 2018-07-14

## 2018-07-14 NOTE — TELEPHONE ENCOUNTER
"  Reason for Disposition   [1] Follow-up call from patient regarding patient's clinical status AND [2] information urgent    Answer Assessment - Initial Assessment Questions  1. REASON FOR CALL or QUESTION: "What is your reason for calling today?" or "How can I best  help you?" or "What question do you have that I can help answer?"       nurse calling to request clarification of medication orders and request a new med   2. CALLER: Document the source of call. (e.g., laboratory, patient).      Imtiaz RN with [facility]     Protocols used:  PCP CALL - NO TRIAGE-A-    Imtiaz RN with [facility]  called because she got a "hard stop" with medication interactions with magnesium, zofran, lexapro and wanted a RX per patient request for diflucan because Dr. Maria ordered doxycycline after surgery for 7 days; patient is adamant all antibiotics cause her to have a yeast infection. Did secure chat with Dr. Moore and he stated that Dr. Maria wants patient to stop taking the doxycycline and f/u with pcp about the other reactions. Imtiaz stated that patient states that she has taking lexapro and zofran for a long time without a problem. Dr Graham deferred that question for her pcp. Imtiaz was provided with the information we had on Dr. Jyoce through our physician finder. Imtiaz verbalized understanding.   "

## 2018-07-16 ENCOUNTER — OFFICE VISIT (OUTPATIENT)
Dept: ORTHOPEDICS | Facility: CLINIC | Age: 73
End: 2018-07-16
Payer: MEDICARE

## 2018-07-16 ENCOUNTER — HOSPITAL ENCOUNTER (OUTPATIENT)
Dept: RADIOLOGY | Facility: HOSPITAL | Age: 73
Discharge: HOME | End: 2018-07-16
Attending: ORTHOPAEDIC SURGERY
Payer: MEDICARE

## 2018-07-16 VITALS
SYSTOLIC BLOOD PRESSURE: 130 MMHG | WEIGHT: 135 LBS | DIASTOLIC BLOOD PRESSURE: 81 MMHG | HEIGHT: 59 IN | HEART RATE: 73 BPM | BODY MASS INDEX: 27.21 KG/M2

## 2018-07-16 DIAGNOSIS — M79.672 PAIN OF LEFT HEEL: ICD-10-CM

## 2018-07-16 DIAGNOSIS — S92.042D: Primary | ICD-10-CM

## 2018-07-16 PROCEDURE — 99999 PR PBB SHADOW E&M-EST. PATIENT-LVL III: CPT | Mod: PBBFAC,,, | Performed by: ORTHOPAEDIC SURGERY

## 2018-07-16 PROCEDURE — 73650 X-RAY EXAM OF HEEL: CPT | Mod: TC,LT

## 2018-07-16 PROCEDURE — 73650 X-RAY EXAM OF HEEL: CPT | Mod: 26,LT,, | Performed by: RADIOLOGY

## 2018-07-16 PROCEDURE — 29405 APPL SHORT LEG CAST: CPT | Mod: 58,LT,S$GLB, | Performed by: ORTHOPAEDIC SURGERY

## 2018-07-16 PROCEDURE — 99024 POSTOP FOLLOW-UP VISIT: CPT | Mod: S$GLB,,, | Performed by: ORTHOPAEDIC SURGERY

## 2018-07-16 NOTE — PROGRESS NOTES
Subjective:     Patient ID: Nayana Bledsoe is a 73 y.o. female.    Chief Complaint: Pain of the Left Foot    She is 5 days out revision ORIF calcaneus fracture. Has been NWB. No drainage. No fever or chills. No accidents or falls. No issue with incisional wound vac.      Foot Pain    The pain is present in the left foot. This is a new problem. The current episode started 1 to 4 weeks ago. Movement associated with injury: falling off ladder.The problem occurs intermittently. Quality: pressure and discomfort. The pain is at a severity of 0/10. Pertinent negatives include no fever or itching. She has tried oral narcotics for the symptoms.       Past Medical History:   Diagnosis Date    Acid reflux     Arthritis     Hiatal hernia      Past Surgical History:   Procedure Laterality Date    CARPAL TUNNEL RELEASE Right     CHOLECYSTECTOMY      COLONOSCOPY      ESOPHAGEAL DILATION      multiple times    HYSTERECTOMY      OPEN REDUCTION AND INTERNAL FIXATION (ORIF) OF INJURY OF FOOT Left 6/18/2018    Procedure: ORIF, FOOT;  Surgeon: Jim Maria MD;  Location: [facility];  Service: Orthopedics;  Laterality: Left;  Calcaneous    TONSILLECTOMY       History reviewed. No pertinent family history.  Social History     Social History    Marital status:      Spouse name: N/A    Number of children: N/A    Years of education: N/A     Occupational History    Not on file.     Social History Main Topics    Smoking status: Never Smoker    Smokeless tobacco: Never Used    Alcohol use Yes      Comment: on weekends    Drug use: No    Sexual activity: Not on file     Other Topics Concern    Not on file     Social History Narrative    No narrative on file     Medication List with Changes/Refills   Current Medications    ASCORBIC ACID, VITAMIN C, (VITAMIN C) 1000 MG TABLET    Take 1,000 mg by mouth.    ASPIRIN (ECOTRIN) 81 MG EC TABLET    Take 1 tablet (81 mg total) by mouth once daily.    COMPOUND HORMONE  "REPLACEMENT    Take 1 tablet by mouth once daily.    DOCUSATE SODIUM (COLACE) 100 MG CAPSULE    Take 1 capsule (100 mg total) by mouth 2 (two) times daily. for 14 days    ESCITALOPRAM OXALATE (LEXAPRO) 20 MG TABLET    Take one and one half tablet daily    FUROSEMIDE (LASIX) 40 MG TABLET    Take 40 mg by mouth.    MAGNESIUM 250 MG TAB    Take by mouth.    MULTIVITAMIN CAPSULE    Take 1 capsule by mouth once daily.    ONDANSETRON (ZOFRAN-ODT) 4 MG TBDL    Take 2 tablets (8 mg total) by mouth every 8 (eight) hours as needed.    OXYCODONE-ACETAMINOPHEN (PERCOCET) 5-325 MG PER TABLET    Take 1-2 tablets every 4-8 hrs for pain control.    PANTOPRAZOLE (PROTONIX) 40 MG TABLET    Take 40 mg by mouth.    POLYETHYLENE GLYCOL (GLYCOLAX) 17 GRAM/DOSE POWDER    Take 17 g by mouth once daily.    UNKNOWN TO PATIENT       Discontinued Medications    DOXYCYCLINE (VIBRA-TABS) 100 MG TABLET    Take 1 tablet (100 mg total) by mouth 2 (two) times daily. for 7 days     Review of patient's allergies indicates:   Allergen Reactions    Codeine      "i dont like the funny feeling it gives me"     Review of Systems   Constitution: Negative for fever.   HENT: Negative for sore throat.    Eyes: Negative for blurred vision.   Cardiovascular: Negative for dyspnea on exertion.   Respiratory: Negative for shortness of breath.    Hematologic/Lymphatic: Does not bruise/bleed easily.   Skin: Negative for itching.   Musculoskeletal: Positive for joint pain.   Gastrointestinal: Negative for vomiting.   Genitourinary: Negative for dysuria.   Neurological: Negative for dizziness.   Psychiatric/Behavioral: The patient does not have insomnia.        Objective:   Body mass index is 27.27 kg/m².  Vitals:    07/16/18 1105   BP: 130/81   Pulse: 73           General    Nursing note and vitals reviewed.  Constitutional: She is oriented to person, place, and time. She appears well-developed. No distress.   HENT:   Head: Normocephalic and atraumatic.   Eyes: EOM " are normal.   Cardiovascular: Normal rate.    Pulmonary/Chest: Effort normal. No stridor.   Neurological: She is alert and oriented to person, place, and time.   Psychiatric: She has a normal mood and affect. Her behavior is normal.         Left Ankle/Foot Exam     Comments:  Skin intact left heel no signs infection. No breakdown, sutures in place.  SLTI SP DP SURAL SAPH TIB, 2+DP wwp toes, wiggles toes        EXAMINATION:  XR CALCANEUS 2 VIEW LEFT    CLINICAL HISTORY:  Pain in left foot    TECHNIQUE:  Tangential and lateral views of the left calcaneus were performed.    COMPARISON:  July 12, 2018    FINDINGS:  Fine detail evaluation limited by cast material.  Orthopedic screws remain within the calcaneus with fracture alignment anatomic.  No detrimental change   Impression       As above      Electronically signed by: Gadiel Elliott MD  Date: 07/16/2018  Time: 11:21            Assessment:     Encounter Diagnosis   Name Primary?    Open displaced fracture of tuberosity of left calcaneus with routine healing, unspecified fracture morphology, subsequent encounter Yes        Plan:     Non weight bearing  Short leg cast applied  Change in 1 week, monitor wound, looks good today     Non weight bearing xrays in cast in 1 week

## 2018-07-17 NOTE — DISCHARGE SUMMARY
Discharge Note    SUMMARY     Admit Date: 7/11/2018    Discharge Date and Time:  7/13/2018  2:08 PM    Pre-op Diagnosis:  Open displaced avulsion fracture of tuberosity of left calcaneus, initial encounter [S92.032B]    Post-op Diagnosis:  Post-Op Diagnosis Codes:     * Open displaced avulsion fracture of tuberosity of left calcaneus, initial encounter [S92.032B]    Procedure: Procedure(s) (LRB):  ORIF, FOOT , ORIF CALCANEUS FRACTURE (Left)  IRRIGATION AND DEBRIDEMENT, LOWER EXTREMITY (Left)  APPLICATION, DRESSING, WOUND (Left)    Hospital Course (synopsis of major diagnoses, care, treatment, and services provided during the course of the hospital stay):      Naayna Bledsoe was admitted and received IV abx upon presentation for open calcaeus fracture. CT was used for pre surgical planning. She underwent I and D of left open calcaneus fracture with revision of fixaiton above without complication. Outpatient wound vac applied incisional wound vac.  [place] underwent 24 hours post operative zosyn IV abx as she had reported reaction to vancomycin. She was discharged the following day in stable condition to home with appropriate follow up as an outpatient arranged.    Final Diagnosis: Post-Op Diagnosis Codes:     * Open displaced avulsion fracture of tuberosity of left calcaneus, initial encounter [S92.032B]    Disposition: Home or Self Care    Follow Up/Patient Instructions: see discharge instructions    Medications:  Reconciled Home Medications:    Medication List      START taking these medications    docusate sodium 100 MG capsule  Commonly known as:  COLACE  Take 1 capsule (100 mg total) by mouth 2 (two) times daily. for 14 days     polyethylene glycol 17 gram/dose powder  Commonly known as:  GLYCOLAX  Take 17 g by mouth once daily.        CHANGE how you take these medications    oxyCODONE-acetaminophen 5-325 mg per tablet  Commonly known as:  PERCOCET  Take 1-2 tablets every 4-8 hrs for pain control.  What  changed:  additional instructions        CONTINUE taking these medications    ascorbic acid (vitamin C) 1000 MG tablet  Commonly known as:  VITAMIN C  Take 1,000 mg by mouth.     aspirin 81 MG EC tablet  Commonly known as:  ECOTRIN  Take 1 tablet (81 mg total) by mouth once daily.     COMPOUND HORMONE REPLACEMENT  Take 1 tablet by mouth once daily.     escitalopram oxalate 20 MG tablet  Commonly known as:  LEXAPRO  Take one and one half tablet daily     furosemide 40 MG tablet  Commonly known as:  LASIX  Take 40 mg by mouth.     magnesium 250 mg Tab  Take by mouth.     multivitamin capsule  Take 1 capsule by mouth once daily.     ondansetron 4 MG Tbdl  Commonly known as:  ZOFRAN-ODT  Take 2 tablets (8 mg total) by mouth every 8 (eight) hours as needed.     pantoprazole 40 MG tablet  Commonly known as:  PROTONIX  Take 40 mg by mouth.     UNKNOWN TO PATIENT            Discharge Procedure Orders  Diet general     Diet Cardiac     Call MD for:  temperature >100.4     Call MD for:  persistent nausea and vomiting     Call MD for:  severe uncontrolled pain     Call MD for:  difficulty breathing, headache or visual disturbances     Call MD for:  redness, tenderness, or signs of infection (pain, swelling, redness, odor or green/yellow discharge around incision site)     Call MD for:  hives     Keep surgical extremity elevated     Ice to affected area     Leave dressing on - Keep it clean, dry, and intact until clinic visit     Non weight bearing   Order Comments: NWB on LLE for 12 weeks     Activity as tolerated       Follow-up Information     Jim Maria MD. Schedule an appointment as soon as possible for a visit on 7/16/2018.    Specialty:  Orthopedic Surgery  Why:  For wound re-check  Contact information:  [address] [place] [address] [phone]             Jonatan Joyce Iii, MD In 3 days.    Specialty:  Internal Medicine  Contact information:  [address]  [address]  [place]  [address]  [phone]             [facility] [place] [facility]    Specialties:  DME Provider, Home Health Services  Why:  Home Health  Contact information:  [address]  [place] [address]  [phone]

## 2018-07-19 DIAGNOSIS — M79.672 LEFT FOOT PAIN: Primary | ICD-10-CM

## 2018-07-23 ENCOUNTER — OFFICE VISIT (OUTPATIENT)
Dept: ORTHOPEDICS | Facility: CLINIC | Age: 73
End: 2018-07-23
Payer: MEDICARE

## 2018-07-23 ENCOUNTER — HOSPITAL ENCOUNTER (OUTPATIENT)
Dept: RADIOLOGY | Facility: HOSPITAL | Age: 73
Discharge: HOME | End: 2018-07-23
Attending: ORTHOPAEDIC SURGERY
Payer: MEDICARE

## 2018-07-23 VITALS — WEIGHT: 134.94 LBS | BODY MASS INDEX: 27.2 KG/M2 | HEIGHT: 59 IN

## 2018-07-23 DIAGNOSIS — M79.672 LEFT FOOT PAIN: ICD-10-CM

## 2018-07-23 DIAGNOSIS — S92.042D: Primary | ICD-10-CM

## 2018-07-23 PROCEDURE — 29405 APPL SHORT LEG CAST: CPT | Mod: 58,LT,S$GLB, | Performed by: ORTHOPAEDIC SURGERY

## 2018-07-23 PROCEDURE — 73630 X-RAY EXAM OF FOOT: CPT | Mod: 26,LT,, | Performed by: RADIOLOGY

## 2018-07-23 PROCEDURE — 73630 X-RAY EXAM OF FOOT: CPT | Mod: TC,LT

## 2018-07-23 PROCEDURE — 99999 PR PBB SHADOW E&M-EST. PATIENT-LVL III: CPT | Mod: PBBFAC,,, | Performed by: ORTHOPAEDIC SURGERY

## 2018-07-23 PROCEDURE — 99024 POSTOP FOLLOW-UP VISIT: CPT | Mod: S$GLB,,, | Performed by: ORTHOPAEDIC SURGERY

## 2018-07-23 NOTE — PROGRESS NOTES
Subjective:     Patient ID: Nayana Bledsoe is a 73 y.o. female.    Chief Complaint: Pain of the Left Foot    She is 11 days out revision ORIF calcaneus fracture. Has been NWB. No drainage. No fever or chills. No accidents or falls.        Foot Pain    The pain is present in the left foot. This is a new problem. The current episode started 1 to 4 weeks ago. Movement associated with injury: falling off ladder.The problem occurs intermittently. The problem has been unchanged. Quality: pressure and discomfort. The pain is at a severity of 0/10. Pertinent negatives include no fever or itching. She has tried oral narcotics (cast) for the symptoms.       Past Medical History:   Diagnosis Date    Acid reflux     Arthritis     Hiatal hernia      Past Surgical History:   Procedure Laterality Date    APPLICATION OF SPLINT Left 7/12/2018    Procedure: APPLICATION, SPLINT;  Surgeon: Jim Maria MD;  Location: [facility] OR;  Service: Orthopedics;  Laterality: Left;    CARPAL TUNNEL RELEASE Right     CHOLECYSTECTOMY      COLONOSCOPY      ESOPHAGEAL DILATION      multiple times    FOOT HARDWARE REMOVAL Left 7/12/2018    Procedure: REMOVAL, HARDWARE, FOOT;  Surgeon: Jim Maria MD;  Location: [facility] OR;  Service: Orthopedics;  Laterality: Left;    HYSTERECTOMY      IRRIGATION AND DEBRIDEMENT OF LOWER EXTREMITY Left 7/12/2018    Procedure: IRRIGATION AND DEBRIDEMENT, LOWER EXTREMITY;  Surgeon: Jim Maria MD;  Location: [facility] OR;  Service: Orthopedics;  Laterality: Left;    OPEN REDUCTION AND INTERNAL FIXATION (ORIF) OF FRACTURE OF CALCANEUS Left 7/12/2018    Procedure: ORIF CALCANEUS FRACTURE;  Surgeon: Jim Maria MD;  Location: [facility] OR;  Service: Orthopedics;  Laterality: Left;    OPEN REDUCTION AND INTERNAL FIXATION (ORIF) OF INJURY OF FOOT Left 6/18/2018    Procedure: ORIF, FOOT;  Surgeon: Jim Maria MD;  Location: [facility] OR;  Service: Orthopedics;  Laterality: Left;  Calcaneous     "TONSILLECTOMY      WOUND DRESSING Left 7/12/2018    Procedure: APPLICATION, DRESSING, WOUND;  Surgeon: Jim Maria MD;  Location: [facility];  Service: Orthopedics;  Laterality: Left;     No family history on file.  Social History     Social History    Marital status:      Spouse name: N/A    Number of children: N/A    Years of education: N/A     Occupational History    Not on file.     Social History Main Topics    Smoking status: Never Smoker    Smokeless tobacco: Never Used    Alcohol use Yes      Comment: on weekends    Drug use: No    Sexual activity: Not on file     Other Topics Concern    Not on file     Social History Narrative    No narrative on file     Medication List with Changes/Refills   Current Medications    ASCORBIC ACID, VITAMIN C, (VITAMIN C) 1000 MG TABLET    Take 1,000 mg by mouth.    ASPIRIN (ECOTRIN) 81 MG EC TABLET    Take 1 tablet (81 mg total) by mouth once daily.    COMPOUND HORMONE REPLACEMENT    Take 1 tablet by mouth once daily.    DOCUSATE SODIUM (COLACE) 100 MG CAPSULE    Take 1 capsule (100 mg total) by mouth 2 (two) times daily. for 14 days    ESCITALOPRAM OXALATE (LEXAPRO) 20 MG TABLET    Take one and one half tablet daily    FUROSEMIDE (LASIX) 40 MG TABLET    Take 40 mg by mouth.    MAGNESIUM 250 MG TAB    Take by mouth.    MULTIVITAMIN CAPSULE    Take 1 capsule by mouth once daily.    ONDANSETRON (ZOFRAN-ODT) 4 MG TBDL    Take 2 tablets (8 mg total) by mouth every 8 (eight) hours as needed.    OXYCODONE-ACETAMINOPHEN (PERCOCET) 5-325 MG PER TABLET    Take 1-2 tablets every 4-8 hrs for pain control.    PANTOPRAZOLE (PROTONIX) 40 MG TABLET    Take 40 mg by mouth.    POLYETHYLENE GLYCOL (GLYCOLAX) 17 GRAM/DOSE POWDER    Take 17 g by mouth once daily.    UNKNOWN TO PATIENT         Review of patient's allergies indicates:   Allergen Reactions    Codeine      "i dont like the funny feeling it gives me"     Review of Systems   Constitution: Negative for fever. "   HENT: Negative for sore throat.    Eyes: Negative for blurred vision.   Cardiovascular: Negative for dyspnea on exertion.   Respiratory: Negative for shortness of breath.    Hematologic/Lymphatic: Does not bruise/bleed easily.   Skin: Negative for itching.   Musculoskeletal: Positive for joint pain.   Gastrointestinal: Negative for vomiting.   Genitourinary: Negative for dysuria.   Neurological: Negative for dizziness.   Psychiatric/Behavioral: The patient does not have insomnia.        Objective:   Body mass index is 27.25 kg/m².  There were no vitals filed for this visit.        General    Nursing note and vitals reviewed.  Constitutional: She is oriented to person, place, and time. She appears well-developed. No distress.   HENT:   Head: Normocephalic and atraumatic.   Eyes: EOM are normal.   Cardiovascular: Normal rate.    Pulmonary/Chest: Effort normal. No stridor.   Neurological: She is alert and oriented to person, place, and time.   Psychiatric: She has a normal mood and affect. Her behavior is normal.         Left Ankle/Foot Exam     Comments:  Skin intact left heel no signs infection. No breakdown, sutures in place.  SLTI SP DP SURAL SAPH TIB, 2+DP wwp toes, wiggles toes           EXAMINATION:  XR FOOT COMPLETE 3 VIEW LEFT    CLINICAL HISTORY:  . Pain in left foot    TECHNIQUE:  Three views of the left foot    COMPARISON:  07/16/2018    FINDINGS:  Superimposed cast obscures fine bony detail.  Postoperative changes again noted of ORIF of comminuted calcaneal fracture with multiple threaded screws in place.  Alignment is anatomic.   Impression       As above.      Electronically signed by: JAMES Barboza MD  Date: 07/23/2018  Time: 09:09         Assessment:     Encounter Diagnosis   Name Primary?    Open displaced fracture of tuberosity of left calcaneus with routine healing, unspecified fracture morphology, subsequent encounter Yes        Plan:     Non weight bearing  Short leg cast reapplied  Change  in 1 week, monitor wound, looks good today, likely sutures out next visit    No xrays needed next visit

## 2018-07-26 ENCOUNTER — TELEPHONE (OUTPATIENT)
Dept: ORTHOPEDICS | Facility: CLINIC | Age: 73
End: 2018-07-26

## 2018-07-26 NOTE — TELEPHONE ENCOUNTER
----- Message from Cliff García sent at 7/26/2018 10:30 AM CDT -----  [facility] [phone]   Would to consult with the nurse about removing cast.  Please call back at [phone].  x-

## 2018-07-26 NOTE — TELEPHONE ENCOUNTER
Pt wound care nurse call asking if pt still needed wound care. Advised Imtiaz due to patient having cast she does not need wound care at this time. Imtiaz vocalized understanding and stated pt PT will continue.

## 2018-07-30 ENCOUNTER — OFFICE VISIT (OUTPATIENT)
Dept: ORTHOPEDICS | Facility: CLINIC | Age: 73
End: 2018-07-30
Payer: MEDICARE

## 2018-07-30 VITALS — HEIGHT: 59 IN | BODY MASS INDEX: 27.2 KG/M2 | WEIGHT: 134.94 LBS

## 2018-07-30 DIAGNOSIS — S92.042D: Primary | ICD-10-CM

## 2018-07-30 PROCEDURE — 99999 PR PBB SHADOW E&M-EST. PATIENT-LVL II: CPT | Mod: PBBFAC,,, | Performed by: ORTHOPAEDIC SURGERY

## 2018-07-30 PROCEDURE — 29405 APPL SHORT LEG CAST: CPT | Mod: 58,LT,S$GLB, | Performed by: ORTHOPAEDIC SURGERY

## 2018-07-30 PROCEDURE — 99024 POSTOP FOLLOW-UP VISIT: CPT | Mod: S$GLB,,, | Performed by: ORTHOPAEDIC SURGERY

## 2018-07-30 RX ORDER — TRAMADOL HYDROCHLORIDE 50 MG/1
50 TABLET ORAL EVERY 6 HOURS PRN
Qty: 30 TABLET | Refills: 0 | Status: SHIPPED | OUTPATIENT
Start: 2018-07-30 | End: 2018-08-09

## 2018-07-30 NOTE — PROGRESS NOTES
Subjective:     Patient ID: Nayana Bledsoe is a 73 y.o. female.    Chief Complaint: Pain of the Left Foot    She is sp revision ORIF calcaneus fracture. Has been NWB. No drainage. No fever or chills. No accidents or falls.  Cast change today, sutures out.      Foot Pain    The pain is present in the left foot. This is a new problem. The current episode started 1 to 4 weeks ago. Movement associated with injury: falling off ladder.The problem occurs intermittently. The problem has been unchanged. Quality: pressure and discomfort. The pain is at a severity of 0/10. Pertinent negatives include no fever or itching. She has tried oral narcotics (cast) for the symptoms.       Past Medical History:   Diagnosis Date    Acid reflux     Arthritis     Hiatal hernia      Past Surgical History:   Procedure Laterality Date    APPLICATION OF SPLINT Left 7/12/2018    Procedure: APPLICATION, SPLINT;  Surgeon: Jim Maria MD;  Location: [facility] OR;  Service: Orthopedics;  Laterality: Left;    CARPAL TUNNEL RELEASE Right     CHOLECYSTECTOMY      COLONOSCOPY      ESOPHAGEAL DILATION      multiple times    FOOT HARDWARE REMOVAL Left 7/12/2018    Procedure: REMOVAL, HARDWARE, FOOT;  Surgeon: Jim Maria MD;  Location: [facility] OR;  Service: Orthopedics;  Laterality: Left;    HYSTERECTOMY      IRRIGATION AND DEBRIDEMENT OF LOWER EXTREMITY Left 7/12/2018    Procedure: IRRIGATION AND DEBRIDEMENT, LOWER EXTREMITY;  Surgeon: Jim Maria MD;  Location: [facility] OR;  Service: Orthopedics;  Laterality: Left;    OPEN REDUCTION AND INTERNAL FIXATION (ORIF) OF FRACTURE OF CALCANEUS Left 7/12/2018    Procedure: ORIF CALCANEUS FRACTURE;  Surgeon: Jim Maria MD;  Location: [facility] OR;  Service: Orthopedics;  Laterality: Left;    OPEN REDUCTION AND INTERNAL FIXATION (ORIF) OF INJURY OF FOOT Left 6/18/2018    Procedure: ORIF, FOOT;  Surgeon: Jim Maria MD;  Location: [facility] OR;  Service: Orthopedics;  Laterality:  "Left;  Calcaneous    TONSILLECTOMY      WOUND DRESSING Left 7/12/2018    Procedure: APPLICATION, DRESSING, WOUND;  Surgeon: Jim Maria MD;  Location: [facility];  Service: Orthopedics;  Laterality: Left;     No family history on file.  Social History     Social History    Marital status:      Spouse name: N/A    Number of children: N/A    Years of education: N/A     Occupational History    Not on file.     Social History Main Topics    Smoking status: Former Smoker    Smokeless tobacco: Never Used    Alcohol use Yes      Comment: on weekends    Drug use: No    Sexual activity: Not on file     Other Topics Concern    Not on file     Social History Narrative    No narrative on file     Medication List with Changes/Refills   New Medications    TRAMADOL (ULTRAM) 50 MG TABLET    Take 1 tablet (50 mg total) by mouth every 6 (six) hours as needed for Pain.   Current Medications    ASCORBIC ACID, VITAMIN C, (VITAMIN C) 1000 MG TABLET    Take 1,000 mg by mouth.    ASPIRIN (ECOTRIN) 81 MG EC TABLET    Take 1 tablet (81 mg total) by mouth once daily.    COMPOUND HORMONE REPLACEMENT    Take 1 tablet by mouth once daily.    ESCITALOPRAM OXALATE (LEXAPRO) 20 MG TABLET    Take one and one half tablet daily    FUROSEMIDE (LASIX) 40 MG TABLET    Take 40 mg by mouth.    MAGNESIUM 250 MG TAB    Take by mouth.    MULTIVITAMIN CAPSULE    Take 1 capsule by mouth once daily.    ONDANSETRON (ZOFRAN-ODT) 4 MG TBDL    Take 2 tablets (8 mg total) by mouth every 8 (eight) hours as needed.    OXYCODONE-ACETAMINOPHEN (PERCOCET) 5-325 MG PER TABLET    Take 1-2 tablets every 4-8 hrs for pain control.    PANTOPRAZOLE (PROTONIX) 40 MG TABLET    Take 40 mg by mouth.    POLYETHYLENE GLYCOL (GLYCOLAX) 17 GRAM/DOSE POWDER    Take 17 g by mouth once daily.    UNKNOWN TO PATIENT         Review of patient's allergies indicates:   Allergen Reactions    Codeine      "i dont like the funny feeling it gives me"     Review of Systems "   Constitution: Negative for fever.   HENT: Negative for sore throat.    Eyes: Negative for blurred vision.   Cardiovascular: Negative for dyspnea on exertion.   Respiratory: Negative for shortness of breath.    Hematologic/Lymphatic: Does not bruise/bleed easily.   Skin: Negative for itching.   Musculoskeletal: Positive for joint pain.   Gastrointestinal: Negative for vomiting.   Genitourinary: Negative for dysuria.   Neurological: Negative for dizziness.   Psychiatric/Behavioral: The patient does not have insomnia.        Objective:   Body mass index is 27.25 kg/m².  There were no vitals filed for this visit.        General    Nursing note and vitals reviewed.  Constitutional: She is oriented to person, place, and time. She appears well-developed. No distress.   HENT:   Head: Normocephalic and atraumatic.   Eyes: EOM are normal.   Cardiovascular: Normal rate.    Pulmonary/Chest: Effort normal. No stridor.   Neurological: She is alert and oriented to person, place, and time.   Psychiatric: She has a normal mood and affect. Her behavior is normal.         Left Ankle/Foot Exam     Comments:  Skin intact left heel no signs infection. No breakdown, sutures in place, incision healing well, sutures removed, good healing.   SLTI SP DP SURAL SAPH TIB, 2+DP wwp toes, wiggles toes           No new xrays today    Assessment:     Encounter Diagnosis   Name Primary?    Open displaced fracture of tuberosity of left calcaneus with routine healing, unspecified fracture morphology, subsequent encounter Yes        Plan:     Non weight bearing  Short leg cast changed  Change in 3 week, wound healing well thus far    xrays next visit out of cast, likely need 8-12 weeks casting

## 2018-08-13 ENCOUNTER — OFFICE VISIT (OUTPATIENT)
Dept: ORTHOPEDICS | Facility: CLINIC | Age: 73
End: 2018-08-13
Payer: MEDICARE

## 2018-08-13 ENCOUNTER — HOSPITAL ENCOUNTER (OUTPATIENT)
Dept: RADIOLOGY | Facility: HOSPITAL | Age: 73
Discharge: HOME | End: 2018-08-13
Attending: ORTHOPAEDIC SURGERY
Payer: MEDICARE

## 2018-08-13 VITALS
HEIGHT: 59 IN | HEART RATE: 59 BPM | DIASTOLIC BLOOD PRESSURE: 70 MMHG | WEIGHT: 134.94 LBS | SYSTOLIC BLOOD PRESSURE: 116 MMHG | BODY MASS INDEX: 27.2 KG/M2

## 2018-08-13 DIAGNOSIS — M79.672 PAIN OF LEFT HEEL: ICD-10-CM

## 2018-08-13 DIAGNOSIS — S92.032D: Primary | ICD-10-CM

## 2018-08-13 DIAGNOSIS — M79.672 PAIN OF LEFT HEEL: Primary | ICD-10-CM

## 2018-08-13 PROCEDURE — 99999 PR PBB SHADOW E&M-EST. PATIENT-LVL III: CPT | Mod: PBBFAC,,, | Performed by: ORTHOPAEDIC SURGERY

## 2018-08-13 PROCEDURE — 73650 X-RAY EXAM OF HEEL: CPT | Mod: 26,LT,, | Performed by: RADIOLOGY

## 2018-08-13 PROCEDURE — 99024 POSTOP FOLLOW-UP VISIT: CPT | Mod: S$GLB,,, | Performed by: ORTHOPAEDIC SURGERY

## 2018-08-13 PROCEDURE — 73650 X-RAY EXAM OF HEEL: CPT | Mod: TC,LT

## 2018-08-13 PROCEDURE — 29405 APPL SHORT LEG CAST: CPT | Mod: 58,LT,S$GLB, | Performed by: ORTHOPAEDIC SURGERY

## 2018-08-13 RX ORDER — HYDROXYZINE HYDROCHLORIDE 25 MG/1
25 TABLET, FILM COATED ORAL EVERY 8 HOURS PRN
Qty: 60 TABLET | Refills: 2 | Status: SHIPPED | OUTPATIENT
Start: 2018-08-13

## 2018-08-13 NOTE — PROGRESS NOTES
Subjective:     Patient ID: Nayaan Bledsoe is a 73 y.o. female.    Chief Complaint: Pain of the Left Foot    She is 5 weeks sp revision ORIF calcaneus fracture. Has been NWB. No drainage. No fever or chills. No accidents or falls.  Cast change today. Notes dependent color changes to foot with gravity. Pain is well controlled.      Foot Pain    The pain is present in the left foot. This is a new problem. The current episode started more than 1 month ago. Movement associated with injury: falling off ladder.The problem occurs intermittently. The problem has been unchanged. Quality: pressure and discomfort. The pain is at a severity of 0/10. Pertinent negatives include no fever or itching. She has tried oral narcotics (cast) for the symptoms.       Past Medical History:   Diagnosis Date    Acid reflux     Arthritis     Hiatal hernia      Past Surgical History:   Procedure Laterality Date    CARPAL TUNNEL RELEASE Right     CHOLECYSTECTOMY      COLONOSCOPY      ESOPHAGEAL DILATION      multiple times    HYSTERECTOMY      TONSILLECTOMY       No family history on file.  Social History     Socioeconomic History    Marital status:      Spouse name: Not on file    Number of children: Not on file    Years of education: Not on file    Highest education level: Not on file   Social Needs    Financial resource strain: Not on file    Food insecurity - worry: Not on file    Food insecurity - inability: Not on file    Transportation needs - medical: Not on file    Transportation needs - non-medical: Not on file   Occupational History    Not on file   Tobacco Use    Smoking status: Former Smoker    Smokeless tobacco: Never Used   Substance and Sexual Activity    Alcohol use: Yes     Comment: on weekends    Drug use: No    Sexual activity: Not on file   Other Topics Concern    Not on file   Social History Narrative    Not on file     Medication List with Changes/Refills   New Medications    HYDROXYZINE  "HCL (ATARAX) 25 MG TABLET    Take 1 tablet (25 mg total) by mouth every 8 (eight) hours as needed for Itching.   Current Medications    ASCORBIC ACID, VITAMIN C, (VITAMIN C) 1000 MG TABLET    Take 1,000 mg by mouth.    ASPIRIN (ECOTRIN) 81 MG EC TABLET    Take 1 tablet (81 mg total) by mouth once daily.    COMPOUND HORMONE REPLACEMENT    Take 1 tablet by mouth once daily.    ESCITALOPRAM OXALATE (LEXAPRO) 20 MG TABLET    Take one and one half tablet daily    FUROSEMIDE (LASIX) 40 MG TABLET    Take 40 mg by mouth.    MAGNESIUM 250 MG TAB    Take by mouth.    MULTIVITAMIN CAPSULE    Take 1 capsule by mouth once daily.    ONDANSETRON (ZOFRAN-ODT) 4 MG TBDL    Take 2 tablets (8 mg total) by mouth every 8 (eight) hours as needed.    OXYCODONE-ACETAMINOPHEN (PERCOCET) 5-325 MG PER TABLET    Take 1-2 tablets every 4-8 hrs for pain control.    PANTOPRAZOLE (PROTONIX) 40 MG TABLET    Take 40 mg by mouth.    POLYETHYLENE GLYCOL (GLYCOLAX) 17 GRAM/DOSE POWDER    Take 17 g by mouth once daily.    UNKNOWN TO PATIENT         Review of patient's allergies indicates:   Allergen Reactions    Codeine      "i dont like the funny feeling it gives me"     Review of Systems   Constitution: Negative for fever.   HENT: Negative for sore throat.    Eyes: Negative for blurred vision.   Cardiovascular: Negative for dyspnea on exertion.   Respiratory: Negative for shortness of breath.    Hematologic/Lymphatic: Does not bruise/bleed easily.   Skin: Negative for itching.   Musculoskeletal: Positive for joint pain.   Gastrointestinal: Negative for vomiting.   Genitourinary: Negative for dysuria.   Neurological: Negative for dizziness.   Psychiatric/Behavioral: The patient has insomnia.        Objective:   Body mass index is 27.25 kg/m².  Vitals:    08/13/18 1353   BP: 116/70   Pulse: (!) 59           General    Nursing note and vitals reviewed.  Constitutional: She is oriented to person, place, and time. She appears well-developed. No distress. "   HENT:   Head: Normocephalic and atraumatic.   Eyes: EOM are normal.   Cardiovascular: Normal rate.    Pulmonary/Chest: Effort normal. No stridor.   Neurological: She is alert and oriented to person, place, and time.   Psychiatric: She has a normal mood and affect. Her behavior is normal.         Left Ankle/Foot Exam     Range of Motion   Ankle Joint  Dorsiflexion: 5   Plantar flexion: 25     Comments:  Skin intact left heel no signs infection. No breakdown, incision healing well, sutures removed, good healing.   SLTI SP DP SURAL SAPH TIB, 2+DP wwp toes, wiggles toes           X-rays with fixation and fracture maintained, no displacement    Assessment:     Encounter Diagnosis   Name Primary?    Open displaced avulsion fracture of tuberosity of left calcaneus with routine healing, subsequent encounter Yes        Plan:     Non weight bearing  Short leg cast changed  Change in 3 week, wound healing well thus far    xrays next visit out of cast, likely need 8-12 weeks casting    Vitamin c continue  desensitization

## 2018-08-27 ENCOUNTER — OFFICE VISIT (OUTPATIENT)
Dept: ORTHOPEDICS | Facility: CLINIC | Age: 73
End: 2018-08-27
Payer: MEDICARE

## 2018-08-27 VITALS
SYSTOLIC BLOOD PRESSURE: 139 MMHG | HEIGHT: 59 IN | DIASTOLIC BLOOD PRESSURE: 78 MMHG | WEIGHT: 134 LBS | BODY MASS INDEX: 27.01 KG/M2 | HEART RATE: 62 BPM

## 2018-08-27 DIAGNOSIS — S92.042D: Primary | ICD-10-CM

## 2018-08-27 PROCEDURE — 99024 POSTOP FOLLOW-UP VISIT: CPT | Mod: S$GLB,,, | Performed by: ORTHOPAEDIC SURGERY

## 2018-08-27 PROCEDURE — 99999 PR PBB SHADOW E&M-EST. PATIENT-LVL III: CPT | Mod: PBBFAC,,, | Performed by: ORTHOPAEDIC SURGERY

## 2018-08-27 PROCEDURE — 29405 APPL SHORT LEG CAST: CPT | Mod: 58,LT,S$GLB, | Performed by: ORTHOPAEDIC SURGERY

## 2018-08-27 NOTE — PROGRESS NOTES
Subjective:     Patient ID: Nayana Bledsoe is a 73 y.o. female.    Chief Complaint: Pain of the Left Foot    She is 6 weeks sp revision ORIF calcaneus fracture. Has been NWB. No drainage. No fever or chills. No accidents or falls.  She is here to have her leg checked. Notes dependent color changes to foot with gravity, this overall is unchanged but she wanted to have it evaluated. No significant pain is associated.      Foot Pain    The pain is present in the left foot. This is a new problem. The current episode started more than 1 month ago. Movement associated with injury: falling off ladder.The problem occurs intermittently. The problem has been unchanged. Quality: pressure and discomfort. The pain is at a severity of 0/10. Pertinent negatives include no fever or itching. She has tried oral narcotics (cast) for the symptoms.       Past Medical History:   Diagnosis Date    Acid reflux     Arthritis     Hiatal hernia      Past Surgical History:   Procedure Laterality Date    CARPAL TUNNEL RELEASE Right     CHOLECYSTECTOMY      COLONOSCOPY      ESOPHAGEAL DILATION      multiple times    HYSTERECTOMY      TONSILLECTOMY       No family history on file.  Social History     Socioeconomic History    Marital status:      Spouse name: Not on file    Number of children: Not on file    Years of education: Not on file    Highest education level: Not on file   Social Needs    Financial resource strain: Not on file    Food insecurity - worry: Not on file    Food insecurity - inability: Not on file    Transportation needs - medical: Not on file    Transportation needs - non-medical: Not on file   Occupational History    Not on file   Tobacco Use    Smoking status: Former Smoker    Smokeless tobacco: Never Used   Substance and Sexual Activity    Alcohol use: Yes     Comment: on weekends    Drug use: No    Sexual activity: Not on file   Other Topics Concern    Not on file   Social History  "Narrative    Not on file     Medication List with Changes/Refills   Current Medications    ASCORBIC ACID, VITAMIN C, (VITAMIN C) 1000 MG TABLET    Take 1,000 mg by mouth.    ASPIRIN (ECOTRIN) 81 MG EC TABLET    Take 1 tablet (81 mg total) by mouth once daily.    COMPOUND HORMONE REPLACEMENT    Take 1 tablet by mouth once daily.    ESCITALOPRAM OXALATE (LEXAPRO) 20 MG TABLET    Take one and one half tablet daily    FUROSEMIDE (LASIX) 40 MG TABLET    Take 40 mg by mouth.    HYDROXYZINE HCL (ATARAX) 25 MG TABLET    Take 1 tablet (25 mg total) by mouth every 8 (eight) hours as needed for Itching.    MAGNESIUM 250 MG TAB    Take by mouth.    MULTIVITAMIN CAPSULE    Take 1 capsule by mouth once daily.    ONDANSETRON (ZOFRAN-ODT) 4 MG TBDL    Take 2 tablets (8 mg total) by mouth every 8 (eight) hours as needed.    OXYCODONE-ACETAMINOPHEN (PERCOCET) 5-325 MG PER TABLET    Take 1-2 tablets every 4-8 hrs for pain control.    PANTOPRAZOLE (PROTONIX) 40 MG TABLET    Take 40 mg by mouth.    POLYETHYLENE GLYCOL (GLYCOLAX) 17 GRAM/DOSE POWDER    Take 17 g by mouth once daily.    UNKNOWN TO PATIENT         Review of patient's allergies indicates:   Allergen Reactions    Codeine      "i dont like the funny feeling it gives me"     Review of Systems   Constitution: Negative for fever.   HENT: Negative for sore throat.    Eyes: Negative for blurred vision.   Cardiovascular: Negative for dyspnea on exertion.   Respiratory: Negative for shortness of breath.    Hematologic/Lymphatic: Does not bruise/bleed easily.   Skin: Negative for itching.   Musculoskeletal: Positive for joint pain.   Gastrointestinal: Negative for vomiting.   Genitourinary: Negative for dysuria.   Neurological: Negative for dizziness.   Psychiatric/Behavioral: The patient has insomnia.        Objective:   Body mass index is 27.06 kg/m².  Vitals:    08/27/18 1128   BP: 139/78   Pulse: 62           General    Nursing note and vitals reviewed.  Constitutional: She is " oriented to person, place, and time. She appears well-developed. No distress.   HENT:   Head: Normocephalic and atraumatic.   Eyes: EOM are normal.   Cardiovascular: Normal rate.    Pulmonary/Chest: Effort normal. No stridor.   Neurological: She is alert and oriented to person, place, and time.   Psychiatric: She has a normal mood and affect. Her behavior is normal.         Left Ankle/Foot Exam     Range of Motion   Ankle Joint  Dorsiflexion: 5   Plantar flexion: 25     Comments:  Skin intact left heel no signs infection. No breakdown, incision healing well, , good healing.   SLTI SP DP SURAL SAPH TIB, 2+DP wwp toes, wiggles toes      No excess sensitivity. With ace wrap and gravity and gravity alone purplish discoloration is present. With elevation skin returns to normal. No hypersensitivity, DF is 10 degrees, PF 30.                no new xrays today    Assessment:     Encounter Diagnosis   Name Primary?    Open displaced fracture of tuberosity of left calcaneus with routine healing, unspecified fracture morphology, subsequent encounter Yes        Plan:     Non weight bearing  Short leg cast changed  Likely remove in 3 weeks and go into boot NWB for 12 weeks total    xrays next visit out of cast, in 3 weeks    Vitamin c continue  desensitization   Therapy to knee and hip continue

## 2018-09-13 ENCOUNTER — TELEPHONE (OUTPATIENT)
Dept: ORTHOPEDICS | Facility: CLINIC | Age: 73
End: 2018-09-13

## 2018-09-13 NOTE — TELEPHONE ENCOUNTER
Spoke with pt about her cast. I asked her if she could rate her pain she stated it was 0/10. I asked her about her swelling she said it went down but there is some swelling in the back of her leg. I encouraged her to get it elevated. I also reminded her of her apt on Monday 9/17/2018 pt asked if she could come early. I told her she could come early. Pt understood and would contact if anything worsen

## 2018-09-14 DIAGNOSIS — R52 PAIN: Primary | ICD-10-CM

## 2018-09-17 ENCOUNTER — HOSPITAL ENCOUNTER (OUTPATIENT)
Dept: RADIOLOGY | Facility: HOSPITAL | Age: 73
Discharge: HOME | End: 2018-09-17
Attending: ORTHOPAEDIC SURGERY
Payer: MEDICARE

## 2018-09-17 ENCOUNTER — OFFICE VISIT (OUTPATIENT)
Dept: ORTHOPEDICS | Facility: CLINIC | Age: 73
End: 2018-09-17
Payer: MEDICARE

## 2018-09-17 VITALS
WEIGHT: 134 LBS | SYSTOLIC BLOOD PRESSURE: 141 MMHG | DIASTOLIC BLOOD PRESSURE: 78 MMHG | BODY MASS INDEX: 27.01 KG/M2 | HEIGHT: 59 IN | HEART RATE: 66 BPM

## 2018-09-17 DIAGNOSIS — R52 PAIN: ICD-10-CM

## 2018-09-17 DIAGNOSIS — S92.042D: Primary | ICD-10-CM

## 2018-09-17 PROCEDURE — 73630 X-RAY EXAM OF FOOT: CPT | Mod: 26,LT,, | Performed by: RADIOLOGY

## 2018-09-17 PROCEDURE — 99214 OFFICE O/P EST MOD 30 MIN: CPT | Mod: PBBFAC,25 | Performed by: ORTHOPAEDIC SURGERY

## 2018-09-17 PROCEDURE — 99999 PR PBB SHADOW E&M-EST. PATIENT-LVL IV: CPT | Mod: PBBFAC,,, | Performed by: ORTHOPAEDIC SURGERY

## 2018-09-17 PROCEDURE — 99024 POSTOP FOLLOW-UP VISIT: CPT | Mod: ,,, | Performed by: ORTHOPAEDIC SURGERY

## 2018-09-17 PROCEDURE — 73630 X-RAY EXAM OF FOOT: CPT | Mod: TC,LT

## 2018-09-17 NOTE — PROGRESS NOTES
Subjective:     Patient ID: Nayana Bledsoe is a 73 y.o. female.    Chief Complaint: Pain of the Left Foot    She is 9 weeks sp revision ORIF calcaneus fracture. Has been NWB. No drainage. No fever or chills. No accidents or falls. Occasional dependent color changes to foot with gravity, this overall is unchanged. Perfusion to toes is unaffected. No significant pain is associated.      Foot Pain    The pain is present in the left foot. This is a new problem. The current episode started more than 1 month ago. Movement associated with injury: falling off ladder.The problem occurs intermittently. The problem has been gradually improving. Quality: pressure and discomfort. The pain is at a severity of 0/10. Pertinent negatives include no fever or itching. She has tried oral narcotics (cast) for the symptoms.       Past Medical History:   Diagnosis Date    Acid reflux     Arthritis     Hiatal hernia      Past Surgical History:   Procedure Laterality Date    APPLICATION OF SPLINT Left 7/12/2018    Procedure: APPLICATION, SPLINT;  Surgeon: Jim Maria MD;  Location: [facility] OR;  Service: Orthopedics;  Laterality: Left;    APPLICATION, DRESSING, WOUND Left 7/12/2018    Performed by Jim Maria MD at [facility] OR    APPLICATION, SPLINT Left 7/12/2018    Performed by Jim Maria MD at [facility] OR    CARPAL TUNNEL RELEASE Right     CHOLECYSTECTOMY      COLONOSCOPY      ESOPHAGEAL DILATION      multiple times    FOOT HARDWARE REMOVAL Left 7/12/2018    Procedure: REMOVAL, HARDWARE, FOOT;  Surgeon: Jim Maria MD;  Location: [facility] OR;  Service: Orthopedics;  Laterality: Left;    HYSTERECTOMY      IRRIGATION AND DEBRIDEMENT OF LOWER EXTREMITY Left 7/12/2018    Procedure: IRRIGATION AND DEBRIDEMENT, LOWER EXTREMITY;  Surgeon: Jim Maria MD;  Location: [facility] OR;  Service: Orthopedics;  Laterality: Left;    IRRIGATION AND DEBRIDEMENT, LOWER EXTREMITY Left 7/12/2018    Performed by Jim ROSE  MD Candace at [facility] OR    LENGTHENING,TENDON,ACHILLES Left 7/12/2018    Performed by Jim Maria MD at [facility] OR    OPEN REDUCTION AND INTERNAL FIXATION (ORIF) OF FRACTURE OF CALCANEUS Left 7/12/2018    Procedure: ORIF CALCANEUS FRACTURE;  Surgeon: Jim Maria MD;  Location: [facility] OR;  Service: Orthopedics;  Laterality: Left;    OPEN REDUCTION AND INTERNAL FIXATION (ORIF) OF INJURY OF FOOT Left 6/18/2018    Procedure: ORIF, FOOT;  Surgeon: Jim aMria MD;  Location: [facility] OR;  Service: Orthopedics;  Laterality: Left;  Calcaneous    ORIF CALCANEUS FRACTURE Left 7/12/2018    Performed by Jim Maria MD at [facility] OR    ORIF, FOOT Left 6/18/2018    Performed by Jim Maria MD at [facility]    REMOVAL, HARDWARE, FOOT Left 7/12/2018    Performed by Jim Maria MD at [facility] OR    TONSILLECTOMY      WOUND DRESSING Left 7/12/2018    Procedure: APPLICATION, DRESSING, WOUND;  Surgeon: Jim Maria MD;  [facility];  Service: Orthopedics;  Laterality: Left;     History reviewed. No pertinent family history.  Social History     Socioeconomic History    Marital status:      Spouse name: Not on file    Number of children: Not on file    Years of education: Not on file    Highest education level: Not on file   Social Needs    Financial resource strain: Not on file    Food insecurity - worry: Not on file    Food insecurity - inability: Not on file    Transportation needs - medical: Not on file    Transportation needs - non-medical: Not on file   Occupational History    Not on file   Tobacco Use    Smoking status: Former Smoker    Smokeless tobacco: Never Used   Substance and Sexual Activity    Alcohol use: Yes     Comment: on weekends    Drug use: No    Sexual activity: Not on file   Other Topics Concern    Not on file   Social History Narrative    Not on file        Medication List           Accurate as of 9/17/18 11:59 PM. If you have any questions,  "ask your nurse or doctor.               CONTINUE taking these medications    ascorbic acid (vitamin C) 1000 MG tablet  Commonly known as:  VITAMIN C     aspirin 81 MG EC tablet  Commonly known as:  ECOTRIN  Take 1 tablet (81 mg total) by mouth once daily.     COMPOUND HORMONE REPLACEMENT     escitalopram oxalate 20 MG tablet  Commonly known as:  LEXAPRO     furosemide 40 MG tablet  Commonly known as:  LASIX     hydrOXYzine HCl 25 MG tablet  Commonly known as:  ATARAX  Take 1 tablet (25 mg total) by mouth every 8 (eight) hours as needed for Itching.     magnesium 250 mg Tab     multivitamin capsule     ondansetron 4 MG Tbdl  Commonly known as:  ZOFRAN-ODT  Take 2 tablets (8 mg total) by mouth every 8 (eight) hours as needed.     oxyCODONE-acetaminophen 5-325 mg per tablet  Commonly known as:  PERCOCET  Take 1-2 tablets every 4-8 hrs for pain control.     pantoprazole 40 MG tablet  Commonly known as:  PROTONIX     polyethylene glycol 17 gram/dose powder  Commonly known as:  GLYCOLAX  Take 17 g by mouth once daily.     UNKNOWN TO PATIENT          Review of patient's allergies indicates:   Allergen Reactions    Codeine      "i dont like the funny feeling it gives me"     Review of Systems   Constitution: Negative for fever.   HENT: Negative for sore throat.    Eyes: Negative for blurred vision.   Cardiovascular: Negative for dyspnea on exertion.   Respiratory: Negative for shortness of breath.    Hematologic/Lymphatic: Does not bruise/bleed easily.   Skin: Negative for itching.   Musculoskeletal: Positive for joint pain.   Gastrointestinal: Negative for vomiting.   Genitourinary: Negative for dysuria.   Neurological: Negative for dizziness.   Psychiatric/Behavioral: The patient has insomnia.        Objective:   Body mass index is 27.06 kg/m².  Vitals:    09/17/18 0851   BP: (!) 141/78   Pulse: 66           General    Nursing note and vitals reviewed.  Constitutional: She is oriented to person, place, and time. She " appears well-developed. No distress.   HENT:   Head: Normocephalic and atraumatic.   Eyes: EOM are normal.   Cardiovascular: Normal rate.    Pulmonary/Chest: Effort normal. No stridor.   Neurological: She is alert and oriented to person, place, and time.   Psychiatric: She has a normal mood and affect. Her behavior is normal.         Left Ankle/Foot Exam     Range of Motion   Ankle Joint  Dorsiflexion: 5   Plantar flexion: 25     Comments:  Skin intact left heel no signs infection. No breakdown, incision healing well, , good healing.   SLTI SP DP SURAL SAPH TIB, 2+DP wwp toes, wiggles toes                        EXAMINATION:  XR FOOT COMPLETE 3 VIEW LEFT    CLINICAL HISTORY:  . Pain, unspecified    TECHNIQUE:  AP, lateral and oblique views of the left foot were performed.    COMPARISON:  July 23, 2018 and August 13, 2018    FINDINGS:  Orthopedic screws are unchanged within the calcaneus. Alignment remains stable. Faint fracture lines remain within the calcaneus.   Report Conclusions  IMPRESSION:      As above      Electronically signed by:Gadiel Elliott MD  Date:09/17/2018  Time:09:00 Radiographs / Imaging : Results reviewed by me and interpreted by me, discussed with the patient and / or family       Assessment:     Encounter Diagnosis   Name Primary?    Open displaced fracture of tuberosity of left calcaneus with routine healing, unspecified fracture morphology, subsequent encounter Yes        Plan:     Non weight bearing for at least 3 more weeks  Short leg cast removed  Placed into boot - okay for ROM and very light resistance, but keep NON WEIGHT BEARING all times    NWB for 12 weeks total    Follow up in 6 weeks with 2 views left calcaneus (Lateral and Noel view)     Lace up ankle brace given to left ankle as she will need this when coming out of boot eventually. She will need to be weight bearing in boot first though to start in 3 weeks, start gradually 25% at first

## 2018-09-25 ENCOUNTER — CLINICAL SUPPORT (OUTPATIENT)
Dept: REHABILITATION | Facility: HOSPITAL | Age: 73
End: 2018-09-25
Attending: ORTHOPAEDIC SURGERY
Payer: MEDICARE

## 2018-09-25 DIAGNOSIS — M79.672 LEFT FOOT PAIN: Primary | ICD-10-CM

## 2018-09-25 PROCEDURE — 97161 PT EVAL LOW COMPLEX 20 MIN: CPT

## 2018-09-25 PROCEDURE — G8979 MOBILITY GOAL STATUS: HCPCS | Mod: CI

## 2018-09-25 PROCEDURE — G8978 MOBILITY CURRENT STATUS: HCPCS | Mod: CL

## 2018-09-25 PROCEDURE — 97140 MANUAL THERAPY 1/> REGIONS: CPT

## 2018-09-25 NOTE — PROGRESS NOTES
PHYSICAL THERAPY INITIAL OUTPATIENT EVALUATION    Referring Provider:  Dr. Jim Maria    Diagnosis:       ICD-10-CM ICD-9-CM    1. Left foot pain M79.672 729.5      Orders:  Evaluate and Treat    Date of Initial Evaluation: 9/25/18 (Re-eval needed for 10/25/18)    Visit # 1 of 20    SUBJECTIVE:Patient reports that on 6/18/18 she jumped off the back of a ladder and fx her calcaneus and underwent surgery. She subsequently accidentally put weight on it and it opened up the surgery and she had to have it redone. She has to be NWBing for 12 weeks and then from there will progress to 25% a week until 100%. She reports that she has had very minimal foot pain since she had the surgery, but she is having a lot of color changes. Reports that she is having some neck/shoulder pain for having to use the FWW so much.    Pain: 2 /10, located L heel area, described as achy  Main Complaint: difficulty with walking due to inability to bear weight    OBJECTIVE:    Gait: NWBing to R LE with FWW , hop to gait    Structure/observation: increased inversion positioning at rest    Sensation: impaired to light touch, reports feels different than the R side      Ankle A/PROM: Plantar Flexion   65%      Dorsi Flexion   Unable to get full forefoot past neutral     Inversion   65%      Eversion   50%      Strength:  Anterior tibialis   3-/5      Posterior tibialis  3/5     Gastrocnemius  3/5      Soleus    3/5     Peroneals   3/5    Muscle Length: decreased into L gastroc and soleus    Special test: none performed    Joint mobility: hypomobile to all ankle/foot joints, especially at the L subtalar          Function: Patient reports 60% disability based on score of the KASHIF on initial evaluation.    Standing                                               Slight  Walking even ground                           Slight  Walking uneven ground no shoes       Slight  Walking up hills                                    Extreme  Walking down hills                                Extreme  Going up stairs                                     Extreme  Going down stairs                                Extreme                                   Walking uneven ground                       Extreme  Stepping up and down curves             Extreme  Squatting                                              Extreme  Sleeping                                               Extreme  Coming up to your toes                       Extreme  Walking initially                                    Extreme  Walking 5 minutes or less                   Extreme  Walking approximately 10 min            Extreme  Walking 15 minutes or greater            Extreme  Home responsibilities                          Extreme  ADLs                                                    Extreme  Personal care                                      Extreme  Light to moderate work                        Extreme  Heavy work                                           Extreme  Recreational activities                         Extreme  General Level of pain                           Mild pain  Pain at rest                                          No pain  Pain during normal activity                  No pain  Pain first thing in the morning             Mild pain    Tenderness to palpation:  Incision to L calcaneus, L achilles tendon    Other: forward head    ASSESSMENT:  The patient is a 73 y.o. year old female who presents to physical therapy with complaints of difficulty with walking.  Patient's impairments include decreased L ankle ROM, decreased ankle/hip strength, increased pain with end range movements, abnormality of gait, and impaired balance.  These impairments are limiting patient's ability to perform work, ADLs, and wanted activities without increase in pain.  Patient's prognosis is good.  Patient will benefit from skilled physical therapy intervention to improve ankle ROM, improve ankle/hip strength, improve pain  with all activities, improve quality of gait, and improve tolerance to all activities in order to improve quality of life.    Co-morbidities which may impact the plan of care and potentially impede the patient's progress in therapy include: arthritis, hiatal hernia    Patients CLINICAL PRESENTATION is STABLE.     Functional Limitations and Goal:   This patient's primary Physical Therapy goal is to improve his current level of function(Walking and moving around ) with minimal limitations. The patient's current level of impairment is 60-79% Impaired(CL) based on their score of 60% impaired on the KASHIF. The Patient is expected to achieve a score of 40-59%(CK) within 10 days of treatment.    Short Term Goals:  1-4 weeks  1. I with HEP  2. Pt to demo increased AROM /PROM to 90%  3. Pt to demo  Increase Ankle strength by 1/2 grade  4. Patient to improve quality of gait with appropriate AD    Long Term Goals: 5-12 weeks  1.Return to all activities with full function  2. Score <10%  on LE functional scale    TREATMENT PROVIDED:  -Manual Therapy:  15 min  STM to L achilles  Gentle joint mobs  PROM to L ankle  -Therapeutic Exercise:  5 min  Isometrics into DF/PF/INV/EV  Resisted PF in long sitting  -Modalities  -Evaluation  -Education: 5 min: on condition and HEP    PLAN:  Patient will benefit from physical therapy (1-2) x/week for (8-12) weeks including manual therapy, therapeutic exercise, functional activities, modalities, and patient education.    Thank you for this referral.    These services are reasonable and necessary for the conditions set forth above while under my care.    Marco-Emely Hill, PT, DPT

## 2018-09-26 NOTE — PLAN OF CARE
PHYSICAL THERAPY INITIAL OUTPATIENT EVALUATION     Referring Provider:  Dr. Jim Maria     Diagnosis:         ICD-10-CM ICD-9-CM     1. Left foot pain M79.672 729.5        Orders:  Evaluate and Treat     Date of Initial Evaluation: 9/25/18 (Re-eval needed for 10/25/18)     Visit # 1 of 20     SUBJECTIVE:Patient reports that on 6/18/18 she jumped off the back of a ladder and fx her calcaneus and underwent surgery. She subsequently accidentally put weight on it and it opened up the surgery and she had to have it redone. She has to be NWBing for 12 weeks and then from there will progress to 25% a week until 100%. She reports that she has had very minimal foot pain since she had the surgery, but she is having a lot of color changes. Reports that she is having some neck/shoulder pain for having to use the FWW so much.     Pain: 2 /10, located L heel area, described as achy  Main Complaint: difficulty with walking due to inability to bear weight     OBJECTIVE:     Gait: NWBing to R LE with FWW , hop to gait     Structure/observation: increased inversion positioning at rest     Sensation: impaired to light touch, reports feels different than the R side                 Ankle A/PROM:          Plantar Flexion                        65%                                           Dorsi Flexion                           Unable to get full forefoot past neutral                                      Inversion                                 65%                                           Eversion                                  50%        Strength:                    Anterior tibialis                        3-/5                                            Posterior tibialis                      3/5                                      Gastrocnemius                        3/5                                             Soleus                                     3/5                                      Peroneals                                 3/5     Muscle Length: decreased into L gastroc and soleus     Special test: none performed     Joint mobility: hypomobile to all ankle/foot joints, especially at the L subtalar                                                                            Function:        Patient reports 60% disability based on score of the KASHIF on initial evaluation.     Standing                                               Slight  Walking even ground                           Slight  Walking uneven ground no shoes       Slight  Walking up hills                                    Extreme  Walking down hills                               Extreme  Going up stairs                                     Extreme  Going down stairs                                Extreme                                   Walking uneven ground                       Extreme  Stepping up and down curves             Extreme  Squatting                                              Extreme  Sleeping                                               Extreme  Coming up to your toes                       Extreme  Walking initially                                    Extreme  Walking 5 minutes or less                   Extreme  Walking approximately 10 min            Extreme  Walking 15 minutes or greater            Extreme  Home responsibilities                          Extreme  ADLs                                                    Extreme  Personal care                                      Extreme  Light to moderate work                        Extreme  Heavy work                                               Extreme  Recreational activities                         Extreme  General Level of pain                           Mild pain  Pain at rest                                          No pain  Pain during normal activity                  No pain  Pain first thing in the morning             Mild pain     Tenderness to palpation:  Incision to L calcaneus, L  achilles tendon     Other: forward head     ASSESSMENT:  The patient is a 73 y.o. year old female who presents to physical therapy with complaints of difficulty with walking.  Patient's impairments include decreased L ankle ROM, decreased ankle/hip strength, increased pain with end range movements, abnormality of gait, and impaired balance.  These impairments are limiting patient's ability to perform work, ADLs, and wanted activities without increase in pain.  Patient's prognosis is good.  Patient will benefit from skilled physical therapy intervention to improve ankle ROM, improve ankle/hip strength, improve pain with all activities, improve quality of gait, and improve tolerance to all activities in order to improve quality of life.     Co-morbidities which may impact the plan of care and potentially impede the patient's progress in therapy include: arthritis, hiatal hernia     Patients CLINICAL PRESENTATION is STABLE.      Functional Limitations and Goal:   This patient's primary Physical Therapy goal is to improve his current level of function(Walking and moving around ) with minimal limitations. The patient's current level of impairment is 60-79% Impaired(CL) based on their score of 60% impaired on the KASHIF. The Patient is expected to achieve a score of 40-59%(CK) within 10 days of treatment.     Short Term Goals:  1-4 weeks  1. I with HEP  2. Pt to demo increased AROM /PROM to 90%  3. Pt to demo  Increase Ankle strength by 1/2 grade  4. Patient to improve quality of gait with appropriate AD     Long Term Goals: 5-12 weeks  1.Return to all activities with full function  2. Score <10%  on LE functional scale     TREATMENT PROVIDED:  -Manual Therapy:  15 min  STM to L achilles  Gentle joint mobs  PROM to L ankle  -Therapeutic Exercise:  5 min  Isometrics into DF/PF/INV/EV  Resisted PF in long sitting  -Modalities  -Evaluation  -Education: 5 min: on condition and HEP     PLAN:  Patient will benefit from  physical therapy (1-2) x/week for (8-12) weeks including manual therapy, therapeutic exercise, functional activities, modalities, and patient education.     Thank you for this referral.     These services are reasonable and necessary for the conditions set forth above while under my care.     Pam Hill, PT, DPT

## 2018-10-03 ENCOUNTER — CLINICAL SUPPORT (OUTPATIENT)
Dept: REHABILITATION | Facility: HOSPITAL | Age: 73
End: 2018-10-03
Attending: ORTHOPAEDIC SURGERY
Payer: MEDICARE

## 2018-10-03 DIAGNOSIS — M79.672 LEFT FOOT PAIN: Primary | ICD-10-CM

## 2018-10-03 PROCEDURE — 97140 MANUAL THERAPY 1/> REGIONS: CPT

## 2018-10-03 PROCEDURE — 97110 THERAPEUTIC EXERCISES: CPT

## 2018-10-04 ENCOUNTER — TELEPHONE (OUTPATIENT)
Dept: ORTHOPEDICS | Facility: CLINIC | Age: 73
End: 2018-10-04

## 2018-10-04 NOTE — TELEPHONE ENCOUNTER
Called pt back, she was asking if I had received her fax, I confirmed with her that I did received her fax. I informed the pt that Dr. Maria is in [place] today so he wouldn't be able to sign any paperwork until tomorrow. Pt understood.         ----- Message from Amparo Acuna sent at 10/4/2018  9:57 AM CDT -----  pt needs callback asap rg discussion..[phone] (home)

## 2018-10-04 NOTE — TELEPHONE ENCOUNTER
Called pt back to inform her that our faxes come through an e-mail, and the staff member who gets those e-mail will be out office until Monday. Pt was upset she stated she need the paperwork today. I informed that no one is at the [place] location to get that paperwork, I informed the pt that I would try and go to the [place] location to see if her paperwork is there and I would call her back. Pt understood.             ----- Message from Francois Rodriguez sent at 10/4/2018  8:02 AM CDT -----  Contact: pt   Pt's employer faxed in disability paperwork that has to be returned today. Pt would like cb to confirm paperwork received.           [phone] (home)

## 2018-10-05 ENCOUNTER — TELEPHONE (OUTPATIENT)
Dept: ORTHOPEDICS | Facility: CLINIC | Age: 73
End: 2018-10-05

## 2018-10-05 NOTE — TELEPHONE ENCOUNTER
I informed the pt that it takes us 5-7 business day until completion of paperwork. Pt understood.      ----- Message from Cliff García sent at 10/5/2018 10:49 AM CDT -----  Contact: [phone]   Pt would like to consult with disability and work. Please call back at [phone].  Thx-AH

## 2018-10-07 NOTE — PROGRESS NOTES
PHYSICAL THERAPY DAILY NOTE    Referring Provider:  Dr. Jim Maria    Diagnosis:       ICD-10-CM ICD-9-CM    1. Left foot pain M79.672 729.5      Orders:  Evaluate and Treat    Date of Initial Evaluation: 9/25/18 (Re-eval needed for 10/25/18)    Visit # 3 of 20    BACKGROUND:Patient reports that on 6/18/18 she jumped off the back of a ladder and fx her calcaneus and underwent surgery. She subsequently accidentally put weight on it and it opened up the surgery and she had to have it redone. She has to be NWBing for 12 weeks and then from there will progress to 25% a week until 100%. She reports that she has had very minimal foot pain since she had the surgery, but she is having a lot of color changes. Reports that she is having some neck/shoulder pain for having to use the FWW so much.    SUBJECTIVE: Patient reports that she is having a little more pain this session due to performing more WBing in the heel to toe gait pattern.    OBJECTIVE:    Gait: NWBing to R LE with FWW , hop to gait    Structure/observation: increased inversion positioning at rest    Sensation: impaired to light touch, reports feels different than the R side      Ankle A/PROM: Plantar Flexion   65%      Dorsi Flexion   Unable to get full forefoot past neutral     Inversion   65%      Eversion   50%      Strength:  Anterior tibialis   3-/5      Posterior tibialis  3/5     Gastrocnemius  3/5      Soleus    3/5     Peroneals   3/5    Muscle Length: decreased into L gastroc and soleus    Special test: none performed    Joint mobility: hypomobile to all ankle/foot joints, especially at the L subtalar          Function: Patient reports 60% disability based on score of the KASHIF on initial evaluation.    Standing                                               Slight  Walking even ground                           Slight  Walking uneven ground no shoes       Slight  Walking up hills                                    Extreme  Walking down hills                                Extreme  Going up stairs                                     Extreme  Going down stairs                                Extreme                                   Walking uneven ground                       Extreme  Stepping up and down curves             Extreme  Squatting                                              Extreme  Sleeping                                               Extreme  Coming up to your toes                       Extreme  Walking initially                                    Extreme  Walking 5 minutes or less                   Extreme  Walking approximately 10 min            Extreme  Walking 15 minutes or greater            Extreme  Home responsibilities                          Extreme  ADLs                                                    Extreme  Personal care                                      Extreme  Light to moderate work                        Extreme  Heavy work                                           Extreme  Recreational activities                         Extreme  General Level of pain                           Mild pain  Pain at rest                                          No pain  Pain during normal activity                  No pain  Pain first thing in the morning             Mild pain    Tenderness to palpation:  Incision to L calcaneus, L achilles tendon    Other: forward head    ASSESSMENT:  Patient demonstrated good tolerance to TTWB this session and educated that she is currently at 25% WBing this week and to not perform heel to toe gait pattern until she is 50% next week. Noted trigger points to R peroneals with improvement at the end of the session and understood her WBing precautions.     TREATMENT PROVIDED:  -Manual Therapy:  24 min  STM to L achilles/peroneals  Gentle joint mobs  PROM to L ankle  -Therapeutic Exercise:  25 min (PT tech assist with 15 min of there ex)   Resisted DF//INV/EV DNT PERFORM THIS SESSION  Standing hip EX/AB/ADD/FL  against TB to L side  Seated LAQ 4#  Standing hamstring curls  Gastroc stretch in long sitting  -Modalities: none provided this session  -Education: 5 min: on condition and HEP    PLAN:  Patient will benefit from physical therapy (1-2) x/week for (8-12) weeks including manual therapy, therapeutic exercise, functional activities, modalities, and patient education.    Thank you for this referral.    These services are reasonable and necessary for the conditions set forth above while under my care.    Pam Hill, PT, DPT

## 2018-10-09 ENCOUNTER — CLINICAL SUPPORT (OUTPATIENT)
Dept: REHABILITATION | Facility: HOSPITAL | Age: 73
End: 2018-10-09
Attending: ORTHOPAEDIC SURGERY
Payer: MEDICARE

## 2018-10-09 ENCOUNTER — TELEPHONE (OUTPATIENT)
Dept: ORTHOPEDICS | Facility: CLINIC | Age: 73
End: 2018-10-09

## 2018-10-09 DIAGNOSIS — M79.672 LEFT FOOT PAIN: Primary | ICD-10-CM

## 2018-10-09 PROCEDURE — 97110 THERAPEUTIC EXERCISES: CPT

## 2018-10-09 PROCEDURE — 97140 MANUAL THERAPY 1/> REGIONS: CPT

## 2018-10-09 NOTE — PROGRESS NOTES
PHYSICAL THERAPY DAILY NOTE    Referring Provider:  Dr. Jim Maria    Diagnosis:       ICD-10-CM ICD-9-CM    1. Left foot pain M79.672 729.5      Orders:  Evaluate and Treat    Date of Initial Evaluation: 9/25/18 (Re-eval needed for 10/25/18)    Visit # 4 of 20    BACKGROUND:Patient reports that on 6/18/18 she jumped off the back of a ladder and fx her calcaneus and underwent surgery. She subsequently accidentally put weight on it and it opened up the surgery and she had to have it redone. She has to be NWBing for 12 weeks and then from there will progress to 25% a week until 100%. She reports that she has had very minimal foot pain since she had the surgery, but she is having a lot of color changes. Reports that she is having some neck/shoulder pain for having to use the FWW so much.    SUBJECTIVE: Patient reports that she was a little more swollen after last treatment, but that her heel pain is better with the TTWBing.    OBJECTIVE:    Gait: NWBing to R LE with FWW , hop to gait    Structure/observation: increased inversion positioning at rest    Sensation: impaired to light touch, reports feels different than the R side      Ankle A/PROM: Plantar Flexion   65%      Dorsi Flexion   Unable to get full forefoot past neutral     Inversion   65%      Eversion   50%      Strength:  Anterior tibialis   3-/5      Posterior tibialis  3/5     Gastrocnemius  3/5      Soleus    3/5     Peroneals   3/5    Muscle Length: decreased into L gastroc and soleus    Special test: none performed    Joint mobility: hypomobile to all ankle/foot joints, especially at the L subtalar          Function: Patient reports 60% disability based on score of the KASHIF on initial evaluation.    Standing                                               Slight  Walking even ground                           Slight  Walking uneven ground no shoes       Slight  Walking up hills                                    Extreme  Walking down hills                                Extreme  Going up stairs                                     Extreme  Going down stairs                                Extreme                                   Walking uneven ground                       Extreme  Stepping up and down curves             Extreme  Squatting                                              Extreme  Sleeping                                               Extreme  Coming up to your toes                       Extreme  Walking initially                                    Extreme  Walking 5 minutes or less                   Extreme  Walking approximately 10 min            Extreme  Walking 15 minutes or greater            Extreme  Home responsibilities                          Extreme  ADLs                                                    Extreme  Personal care                                      Extreme  Light to moderate work                        Extreme  Heavy work                                           Extreme  Recreational activities                         Extreme  General Level of pain                           Mild pain  Pain at rest                                          No pain  Pain during normal activity                  No pain  Pain first thing in the morning             Mild pain    Tenderness to palpation:  Incision to L calcaneus, L achilles tendon    Other: forward head    ASSESSMENT:  Patient demonstrated good tolerance to there ex and STM to L LE with minimal adverse symptoms. Noted that with dependent hanging of her L LE that it turns purple somewhat quickly and that from her knee down it will start to swell. Also noted increased temperature to the L lateral part of her ankle with increased swelling after dependent hang 1-2 minutes. Sending message to the MD just for an update.    TREATMENT PROVIDED:  -Manual Therapy:  26 min  STM to L achilles/peroneals  Gentle joint mobs  PROM to L ankle  -Therapeutic Exercise:  25 min (PT tech assist with 25  min of there ex)   Standing hip EX/AB/ADD/FL against TB to L side  Seated LAQ 4#  Standing hamstring curls  Gastroc stretch in long sitting  -Modalities: none provided this session  -Education: 5 min: on condition and HEP    PLAN:  Patient will benefit from physical therapy (1-2) x/week for (8-12) weeks including manual therapy, therapeutic exercise, functional activities, modalities, and patient education.    Thank you for this referral.    These services are reasonable and necessary for the conditions set forth above while under my care.    Pam Hill, PT, DPT

## 2018-10-09 NOTE — TELEPHONE ENCOUNTER
Pt is picking up paperwork today.          ----- Message from Jolynn Montesinos sent at 10/9/2018 11:12 AM CDT -----  Contact: pt   States she's calling rg her [place] paperwork and has come questions and can be reached at [phone]//thanks/dbw

## 2018-10-11 ENCOUNTER — CLINICAL SUPPORT (OUTPATIENT)
Dept: REHABILITATION | Facility: HOSPITAL | Age: 73
End: 2018-10-11
Attending: ORTHOPAEDIC SURGERY
Payer: MEDICARE

## 2018-10-11 DIAGNOSIS — M79.672 LEFT FOOT PAIN: Primary | ICD-10-CM

## 2018-10-11 PROCEDURE — 97140 MANUAL THERAPY 1/> REGIONS: CPT

## 2018-10-17 ENCOUNTER — TELEPHONE (OUTPATIENT)
Dept: ORTHOPEDICS | Facility: CLINIC | Age: 73
End: 2018-10-17

## 2018-10-17 DIAGNOSIS — Z98.890 STATUS POST SURGERY: Primary | ICD-10-CM

## 2018-10-17 NOTE — TELEPHONE ENCOUNTER
Called pt back, she stated that her foot begin to swell and turn a blue color when ever she is weight bearing, but whenever she has her foot elevated the swelling and color changing goes down. I asked the pt would they like an apt with our vascular department. Pt said yes. I informed her that we will put the order in and that department would be giving her a call to schedule everything. Pt understood.         ----- Message from Jim Maria MD sent at 10/17/2018 11:16 AM CDT -----  Can you call and see if she would like to see vascular for consult given her color changes?

## 2018-10-18 NOTE — PROGRESS NOTES
PHYSICAL THERAPY DAILY NOTE    Referring Provider:  Dr. Jim Maria    Diagnosis:       ICD-10-CM ICD-9-CM    1. Left foot pain M79.672 729.5      Orders:  Evaluate and Treat    Date of Initial Evaluation: 9/25/18 (Re-eval needed for 10/25/18)    Visit # 5 of 20    BACKGROUND:Patient reports that on 6/18/18 she jumped off the back of a ladder and fx her calcaneus and underwent surgery. She subsequently accidentally put weight on it and it opened up the surgery and she had to have it redone. She has to be NWBing for 12 weeks and then from there will progress to 25% a week until 100%. She reports that she has had very minimal foot pain since she had the surgery, but she is having a lot of color changes. Reports that she is having some neck/shoulder pain for having to use the FWW so much.    SUBJECTIVE: Patient reports that the increased Wbing has been going okay, but she is having a little more L heel pain in the last week.     OBJECTIVE:    Gait: NWBing to R LE with FWW , hop to gait    Structure/observation: increased inversion positioning at rest    Sensation: impaired to light touch, reports feels different than the R side      Ankle A/PROM: Plantar Flexion   65%      Dorsi Flexion   Unable to get full forefoot past neutral     Inversion   65%      Eversion   50%      Strength:  Anterior tibialis   3-/5      Posterior tibialis  3/5     Gastrocnemius  3/5      Soleus    3/5     Peroneals   3/5    Muscle Length: decreased into L gastroc and soleus    Special test: none performed    Joint mobility: hypomobile to all ankle/foot joints, especially at the L subtalar          Function: Patient reports 60% disability based on score of the KASHIF on initial evaluation.    Standing                                               Slight  Walking even ground                           Slight  Walking uneven ground no shoes       Slight  Walking up hills                                    Extreme  Walking down hills                                Extreme  Going up stairs                                     Extreme  Going down stairs                                Extreme                                   Walking uneven ground                       Extreme  Stepping up and down curves             Extreme  Squatting                                              Extreme  Sleeping                                               Extreme  Coming up to your toes                       Extreme  Walking initially                                    Extreme  Walking 5 minutes or less                   Extreme  Walking approximately 10 min            Extreme  Walking 15 minutes or greater            Extreme  Home responsibilities                          Extreme  ADLs                                                    Extreme  Personal care                                      Extreme  Light to moderate work                        Extreme  Heavy work                                           Extreme  Recreational activities                         Extreme  General Level of pain                           Mild pain  Pain at rest                                          No pain  Pain during normal activity                  No pain  Pain first thing in the morning             Mild pain    Tenderness to palpation:  Incision to L calcaneus, L achilles tendon    Other: forward head    ASSESSMENT:  Patient demonstrated good tolerance to there ex this session with minimal increase in pain/discomfort. Patient having more heel pain with WBing and having a little more swelling than last session and given tubigrip to help with the swelling during the day.     TREATMENT PROVIDED:  -Manual Therapy:  16 min  STM to L achilles/peroneals  Gentle joint mobs  PROM to L ankle  -Therapeutic Exercise:  35 min  Standing hip EX/AB/ADD/FL against TB to L side  Seated LAQ 4#  Standing hamstring curls  Dynamic stretch gastroc  Total gym 5 bands  Nustep x 8 min  Standing heel  raises  -Modalities: none provided this session  -Education: 5 min: on condition and HEP    PLAN:  Patient will benefit from physical therapy (1-2) x/week for (8-12) weeks including manual therapy, therapeutic exercise, functional activities, modalities, and patient education.    Thank you for this referral.    These services are reasonable and necessary for the conditions set forth above while under my care.    Marco-Emely Hill, PT, DPT

## 2018-10-19 ENCOUNTER — CLINICAL SUPPORT (OUTPATIENT)
Dept: REHABILITATION | Facility: HOSPITAL | Age: 73
End: 2018-10-19
Attending: ORTHOPAEDIC SURGERY
Payer: MEDICARE

## 2018-10-19 DIAGNOSIS — M79.672 LEFT FOOT PAIN: Primary | ICD-10-CM

## 2018-10-19 PROCEDURE — 97140 MANUAL THERAPY 1/> REGIONS: CPT

## 2018-10-19 PROCEDURE — 97110 THERAPEUTIC EXERCISES: CPT

## 2018-10-22 ENCOUNTER — TELEPHONE (OUTPATIENT)
Dept: ORTHOPEDICS | Facility: CLINIC | Age: 73
End: 2018-10-22

## 2018-10-22 NOTE — TELEPHONE ENCOUNTER
Called pt back, she was asking which doctor she was being was being sent to for vascular medicine  . I informed her it is was Dr. Payton. Pt understood.      ----- Message from Sarah Lopez sent at 10/22/2018  2:23 PM CDT -----  Contact: pt  She is calling in regards to getting a referral for a cardiologist, please advise [phone] (home)

## 2018-10-22 NOTE — TELEPHONE ENCOUNTER
Called pt  Back , she called dr. ruiz office, they stated they didn't see a referral. I informed her that it is in and we can't schedule it their office has too/ she understood. I also told her she can call her insurance  Office and see what's in network for her and call us back with a fax number and we will fax that order off for her,. But in the meantime we will put in another order to dr. Ruiz office. Pt understood.           4  ----- Message from Imani Perkins sent at 10/22/2018  3:46 PM CDT -----  Contact: Pt  Please give pt a call at ..[phone] (home) she forgot to ask the nurse a question.

## 2018-10-22 NOTE — TELEPHONE ENCOUNTER
I informed the pt that we faxed off her referral to Dr. Turner office. I informed her that the fax went through to their office. Pt understood.

## 2018-10-22 NOTE — TELEPHONE ENCOUNTER
Spoke with pt letting her know that I have sent a staff message to Dr. Payton and transferred pt to his staff so she could speak with them.

## 2018-10-22 NOTE — PROGRESS NOTES
PHYSICAL THERAPY DAILY NOTE    Referring Provider:  Dr. Jim Maria    Diagnosis:       ICD-10-CM ICD-9-CM    1. Left foot pain M79.672 729.5      Orders:  Evaluate and Treat    Date of Initial Evaluation: 9/25/18 (Re-eval needed for 10/25/18)    Visit # 6 of 20    BACKGROUND:Patient reports that on 6/18/18 she jumped off the back of a ladder and fx her calcaneus and underwent surgery. She subsequently accidentally put weight on it and it opened up the surgery and she had to have it redone. She has to be NWBing for 12 weeks and then from there will progress to 25% a week until 100%. She reports that she has had very minimal foot pain since she had the surgery, but she is having a lot of color changes. Reports that she is having some neck/shoulder pain for having to use the FWW so much.    SUBJECTIVE: Patient reports that she is having a little pain with WBing on her L heel, but it is only minimal.    OBJECTIVE:    Gait: NWBing to R LE with FWW , hop to gait    Structure/observation: increased inversion positioning at rest    Sensation: impaired to light touch, reports feels different than the R side      Ankle A/PROM: Plantar Flexion   65%      Dorsi Flexion   Unable to get full forefoot past neutral     Inversion   65%      Eversion   50%      Strength:  Anterior tibialis   3-/5      Posterior tibialis  3/5     Gastrocnemius  3/5      Soleus    3/5     Peroneals   3/5    Muscle Length: decreased into L gastroc and soleus    Special test: none performed    Joint mobility: hypomobile to all ankle/foot joints, especially at the L subtalar          Function: Patient reports 60% disability based on score of the KASHIF on initial evaluation.    Standing                                               Slight  Walking even ground                           Slight  Walking uneven ground no shoes       Slight  Walking up hills                                    Extreme  Walking down hills                                Extreme  Going up stairs                                     Extreme  Going down stairs                                Extreme                                   Walking uneven ground                       Extreme  Stepping up and down curves             Extreme  Squatting                                              Extreme  Sleeping                                               Extreme  Coming up to your toes                       Extreme  Walking initially                                    Extreme  Walking 5 minutes or less                   Extreme  Walking approximately 10 min            Extreme  Walking 15 minutes or greater            Extreme  Home responsibilities                          Extreme  ADLs                                                    Extreme  Personal care                                      Extreme  Light to moderate work                        Extreme  Heavy work                                           Extreme  Recreational activities                         Extreme  General Level of pain                           Mild pain  Pain at rest                                          No pain  Pain during normal activity                  No pain  Pain first thing in the morning             Mild pain    Tenderness to palpation:  Incision to L calcaneus, L achilles tendon    Other: forward head    ASSESSMENT:  Patient demonstrated good tolerance to more WBing activity this session with minimal increased in pain. Still having some heel pain with increased heel weight, but improves quickly with rest. Patient currently trying to schedule vascular appt due to the color changes and difficulty with swelling to L LE. Starting next week, patient will be able to put 100% weight down and we can start gait training with a cane.    TREATMENT PROVIDED:  -Manual Therapy:  16 min  STM to L achilles/peroneals  Gentle joint mobs  PROM to L ankle  -Therapeutic Exercise:  45 min  Standing hip EX/AB/ADD/FL  against TB to L side  Seated LAQ 4#  Standing hamstring curls  Dynamic stretch gastroc  Total gym 5 bands  Nustep x 8 min  Standing heel raises  Gastroc stretch  Gait training with FWW   -Modalities: none provided this session  -Education: 5 min: on condition and HEP    PLAN:  Patient will benefit from physical therapy (1-2) x/week for (8-12) weeks including manual therapy, therapeutic exercise, functional activities, modalities, and patient education.    Thank you for this referral.    These services are reasonable and necessary for the conditions set forth above while under my care.    Pam Hill, PT, DPT

## 2018-10-22 NOTE — TELEPHONE ENCOUNTER
----- Message from Amparo Acuna sent at 10/22/2018  8:17 AM CDT -----  needs vascular surgeon [facility] (unable to)....[phone] (home)

## 2018-10-23 ENCOUNTER — CLINICAL SUPPORT (OUTPATIENT)
Dept: REHABILITATION | Facility: HOSPITAL | Age: 73
End: 2018-10-23
Attending: ORTHOPAEDIC SURGERY
Payer: MEDICARE

## 2018-10-23 DIAGNOSIS — M79.672 LEFT FOOT PAIN: Primary | ICD-10-CM

## 2018-10-23 PROCEDURE — 97110 THERAPEUTIC EXERCISES: CPT

## 2018-10-23 PROCEDURE — 97140 MANUAL THERAPY 1/> REGIONS: CPT

## 2018-10-25 NOTE — PROGRESS NOTES
PHYSICAL THERAPY RE-EVALUATION    Referring Provider:  Dr. Jim Maria    Diagnosis:       ICD-10-CM ICD-9-CM    1. Left foot pain M79.672 729.5      Orders:  Evaluate and Treat    Date of Initial Evaluation: 9/25/18 (Re-eval needed for 10/25/18)    Visit # 7 of 20    BACKGROUND:Patient reports that on 6/18/18 she jumped off the back of a ladder and fx her calcaneus and underwent surgery. She subsequently accidentally put weight on it and it opened up the surgery and she had to have it redone. She has to be NWBing for 12 weeks and then from there will progress to 25% a week until 100%. She reports that she has had very minimal foot pain since she had the surgery, but she is having a lot of color changes. Reports that she is having some neck/shoulder pain for having to use the FWW so much.    SUBJECTIVE: Patient reports that she is continuing to improve and is having less pain with WBing, but did have increase in swelling to the lateral part of the ankle with longer standing activities.     OBJECTIVE    Gait: 75% WBing in boot and decreased weight shift to the R side and decreased heel to toe gait pattern in the boot    Structure/observation: increased inversion positioning at rest    Sensation: impaired to light touch, reports feels different than the R side      Ankle A/PROM: Plantar Flexion   65%      Dorsi Flexion   80%     Inversion   65%      Eversion   50%      Strength:  Anterior tibialis   3+/5      Posterior tibialis  3+/5     Gastrocnemius  3+/5      Soleus    3+/5     Peroneals   3+/5    Muscle Length: decreased into L gastroc and soleus    Joint mobility: hypomobile to all ankle/foot joints, especially at the L subtalar          Function: Patient reports 60% disability based on score of the KASHIF on initial evaluation. Patient scored a 42.3% disability on the KASHIF on 10/26/18 on re-evaluation.    Standing                                               None  Walking even ground                            Slight  Walking uneven ground no shoes       Slight  Walking up hills                                    Extreme  Walking down hills                               Unable  Going up stairs                                     Unable  Going down stairs                                Extreme                                   Walking uneven ground                       Moderate  Stepping up and down curves             Moderate  Squatting                                              Slight  Sleeping                                               Moderate  Coming up to your toes                       Moderate  Walking initially                                    Unable  Walking 5 minutes or less                   Extreme  Walking approximately 10 min            Unable  Walking 15 minutes or greater            Extreme  Home responsibilities                          Extreme  ADLs                                                    Extreme  Personal care                                      None  Light to moderate work                        Unable  Heavy work                                           Unable  Recreational activities                         Unable  General Level of pain                           Mild pain  Pain at rest                                          No pain  Pain during normal activity                  Mild  Pain first thing in the morning             Mild pain    Tenderness to palpation:  Incision to L calcaneus, L achilles tendon    Other: forward head    ASSESSMENT:  Patient continues to demonstrate good tolerance to there ex and ankle ROM, but is still having a little pain with her 75% WBing but this has improved. Patient has been having some issues with swelling that are limiting her L ankle ROM when she is standing or when she is up and walking and has been referred to a vascular MD. Next week patient will be at 100% and we will be able to start more gait training with different AD and other  load tolerance activities. Patient needs continued therapy in order to improve ROM, improve strength, improve quality of gait, decrease pain, and improve tolerance to WBing in order to decrease risk for falls and improve quality of life.    Functional Limitations and Goal:   This patient's primary Physical Therapy goal is to improve his current level of function(Walking and moving around ) with minimal limitations. The patient's current level of impairment is 40-59% Impaired(CK) based on their score of 42% impaired on the KASHIF. The Patient is expected to achieve a score of 20-39%(CJ) within 10 days of treatment.    Short Term Goals:  1-4 weeks  1. I with HEP MET  2. Pt to demo increased AROM /PROM to 90% ALMOST MET  3. Pt to demo  Increase Ankle strength by 1/2 grade MET  4. Patient to improve quality of gait with appropriate AD ALMOST MET    Long Term Goals: 5-12 weeks  1.Return to all activities with full function NOT MET  2. Score <10%  on KASHIF NOT MET    TREATMENT PROVIDED:  -Manual Therapy:  16 min  STM to L achilles/peroneals  Gentle joint mobs  PROM to L ankle  -Therapeutic Exercise:  42 min  Standing hip EX/AB/ADD/FL against TB to L side  Seated LAQ 4#  Standing hamstring curls  Dynamic stretch gastroc  Total gym 5 bands  Nustep x 8 min  Standing heel raises  Gastroc stretch  -Modalities: none provided this session  -Education: 5 min: on condition and HEP    PLAN:  Patient will benefit from physical therapy (1-2) x/week for (8-12) weeks including manual therapy, therapeutic exercise, functional activities, modalities, and patient education.    Thank you for this referral.    These services are reasonable and necessary for the conditions set forth above while under my care.    Pam Hill, PT, DPT

## 2018-10-26 ENCOUNTER — CLINICAL SUPPORT (OUTPATIENT)
Dept: REHABILITATION | Facility: HOSPITAL | Age: 73
End: 2018-10-26
Attending: ORTHOPAEDIC SURGERY
Payer: MEDICARE

## 2018-10-26 DIAGNOSIS — M79.672 LEFT FOOT PAIN: Primary | ICD-10-CM

## 2018-10-26 PROCEDURE — G8979 MOBILITY GOAL STATUS: HCPCS | Mod: CI

## 2018-10-26 PROCEDURE — 97110 THERAPEUTIC EXERCISES: CPT

## 2018-10-26 PROCEDURE — 97140 MANUAL THERAPY 1/> REGIONS: CPT

## 2018-10-26 PROCEDURE — G8978 MOBILITY CURRENT STATUS: HCPCS | Mod: CK

## 2018-10-26 NOTE — PROGRESS NOTES
PHYSICAL THERAPY DAILY NOTE    Referring Provider:  Dr. Jim Maria    Diagnosis:       ICD-10-CM ICD-9-CM    1. Left foot pain M79.672 729.5      Orders:  Evaluate and Treat    Date of Initial Evaluation: 9/25/18 (Re-eval needed for 11/25/18)    Visit # 8 of 20    BACKGROUND:Patient reports that on 6/18/18 she jumped off the back of a ladder and fx her calcaneus and underwent surgery. She subsequently accidentally put weight on it and it opened up the surgery and she had to have it redone. She has to be NWBing for 12 weeks and then from there will progress to 25% a week until 100%. She reports that she has had very minimal foot pain since she had the surgery, but she is having a lot of color changes. Reports that she is having some neck/shoulder pain for having to use the FWW so much.    SUBJECTIVE: Patient reports that she has starting the 100% WBing on her RLE and it has been going okay. Still having a good bit of swelling and slight pain with increased WBing o her R side.    OBJECTIVE    Gait: 75% WBing in boot and decreased weight shift to the R side and decreased heel to toe gait pattern in the boot    Structure/observation: increased inversion positioning at rest    Sensation: impaired to light touch, reports feels different than the R side      Ankle A/PROM: Plantar Flexion   65%      Dorsi Flexion   80%     Inversion   65%      Eversion   50%      Strength:  Anterior tibialis   3+/5      Posterior tibialis  3+/5     Gastrocnemius  3+/5      Soleus    3+/5     Peroneals   3+/5    Muscle Length: decreased into L gastroc and soleus    Joint mobility: hypomobile to all ankle/foot joints, especially at the L subtalar          Function: Patient reports 60% disability based on score of the KASHIF on initial evaluation. Patient scored a 42.3% disability on the KASHIF on 10/26/18 on re-evaluation.    Standing                                               None  Walking even ground                            Slight  Walking uneven ground no shoes       Slight  Walking up hills                                    Extreme  Walking down hills                               Unable  Going up stairs                                     Unable  Going down stairs                                Extreme                                   Walking uneven ground                       Moderate  Stepping up and down curves             Moderate  Squatting                                              Slight  Sleeping                                               Moderate  Coming up to your toes                       Moderate  Walking initially                                    Unable  Walking 5 minutes or less                   Extreme  Walking approximately 10 min            Unable  Walking 15 minutes or greater            Extreme  Home responsibilities                          Extreme  ADLs                                                    Extreme  Personal care                                      None  Light to moderate work                        Unable  Heavy work                                           Unable  Recreational activities                         Unable  General Level of pain                           Mild pain  Pain at rest                                          No pain  Pain during normal activity                  Mild  Pain first thing in the morning             Mild pain    Tenderness to palpation:  Incision to L calcaneus, L achilles tendon    Other: forward head    ASSESSMENT: Patient demonstrates good tolerance to increased WBing this session and able to gait train with improvement in quality with use of 4WW. Patient has follow up with ortho on Wednesday and will see how we can start weaning off of the boot.    TREATMENT PROVIDED:  -Manual Therapy:  16 min  STM to L achilles/peroneals  Gentle joint mobs  PROM to L ankle  -Therapeutic Exercise:  35 min (PT tech assisted with 15 min of there ex)  Standing hip  EX/AB/ADD/FL against TB to L side  Seated LAQ 4#  Standing hamstring curls  Dynamic stretch gastroc  Total gym 5 bands  Nustep x 10 min  Standing heel raises  Gastroc stretch  Total gym 4 bands SL squat  Gait training with 4WW  -Modalities: none provided this session  -Education: 5 min: on condition and HEP    PLAN:  Patient will benefit from physical therapy (1-2) x/week for (8-12) weeks including manual therapy, therapeutic exercise, functional activities, modalities, and patient education.    Thank you for this referral.    These services are reasonable and necessary for the conditions set forth above while under my care.    Pam Hill, PT, DPT

## 2018-10-29 ENCOUNTER — CLINICAL SUPPORT (OUTPATIENT)
Dept: REHABILITATION | Facility: HOSPITAL | Age: 73
End: 2018-10-29
Attending: ORTHOPAEDIC SURGERY
Payer: MEDICARE

## 2018-10-29 DIAGNOSIS — M79.672 LEFT FOOT PAIN: Primary | ICD-10-CM

## 2018-10-29 PROCEDURE — 97140 MANUAL THERAPY 1/> REGIONS: CPT

## 2018-10-29 PROCEDURE — 97110 THERAPEUTIC EXERCISES: CPT

## 2018-10-30 DIAGNOSIS — R52 PAIN: Primary | ICD-10-CM

## 2018-10-31 ENCOUNTER — OFFICE VISIT (OUTPATIENT)
Dept: ORTHOPEDICS | Facility: CLINIC | Age: 73
End: 2018-10-31
Payer: MEDICARE

## 2018-10-31 ENCOUNTER — TELEPHONE (OUTPATIENT)
Dept: ORTHOPEDICS | Facility: CLINIC | Age: 73
End: 2018-10-31

## 2018-10-31 ENCOUNTER — HOSPITAL ENCOUNTER (OUTPATIENT)
Dept: RADIOLOGY | Facility: HOSPITAL | Age: 73
Discharge: HOME | End: 2018-10-31
Attending: ORTHOPAEDIC SURGERY
Payer: MEDICARE

## 2018-10-31 VITALS
HEART RATE: 65 BPM | SYSTOLIC BLOOD PRESSURE: 144 MMHG | BODY MASS INDEX: 27.01 KG/M2 | DIASTOLIC BLOOD PRESSURE: 83 MMHG | HEIGHT: 59 IN | WEIGHT: 134 LBS

## 2018-10-31 DIAGNOSIS — Z98.890 STATUS POST SURGERY: ICD-10-CM

## 2018-10-31 DIAGNOSIS — S92.032D: Primary | ICD-10-CM

## 2018-10-31 DIAGNOSIS — M79.672 PAIN OF LEFT HEEL: ICD-10-CM

## 2018-10-31 DIAGNOSIS — R52 PAIN: ICD-10-CM

## 2018-10-31 PROCEDURE — 99213 OFFICE O/P EST LOW 20 MIN: CPT | Mod: S$PBB,,, | Performed by: ORTHOPAEDIC SURGERY

## 2018-10-31 PROCEDURE — 73630 X-RAY EXAM OF FOOT: CPT | Mod: 26,LT,, | Performed by: RADIOLOGY

## 2018-10-31 PROCEDURE — 99999 PR PBB SHADOW E&M-EST. PATIENT-LVL III: CPT | Mod: PBBFAC,,, | Performed by: ORTHOPAEDIC SURGERY

## 2018-10-31 PROCEDURE — 99213 OFFICE O/P EST LOW 20 MIN: CPT | Mod: PBBFAC,25 | Performed by: ORTHOPAEDIC SURGERY

## 2018-10-31 PROCEDURE — 73630 X-RAY EXAM OF FOOT: CPT | Mod: TC,LT

## 2018-10-31 NOTE — TELEPHONE ENCOUNTER
Pt was instructed to return back to work for 11/5/18 with restrictions of wearing cam boot, 75% WB LLE, no more than standing for 1 hour a day, and ice and elevate left foot.     Pt stated she can not do that.   Her job is looking into if she is able to wear boot to work- if she is not able to return to work she may need to see a doctor for long term disability.

## 2018-10-31 NOTE — PROGRESS NOTES
Subjective:     Patient ID: Nayana Bledsoe is a 73 y.o. female.    Chief Complaint: Pain of the Left Foot    She is 16 weeks sp revision ORIF calcaneus fracture. Has been progressing weight to 50% in boot. No wound issus, no drainage. No fever or chills. No accidents or falls. Occasional dependent color changes to foot with gravity, this overall is unchanged. Perfusion to toes is unaffected. No significant pain is associated.      Foot Pain    The pain is present in the left foot. The current episode started more than 1 month ago. Movement associated with injury: falling off ladder.The problem occurs intermittently. The problem has been resolved. The quality of the pain is described as aching (pressure and discomfort). The pain is at a severity of 4/10. Pertinent negatives include no fever or itching. She has tried oral narcotics (cast) for the symptoms. Physical therapy was effective.      Past Medical History:   Diagnosis Date    Acid reflux     Arthritis     Hiatal hernia      Past Surgical History:   Procedure Laterality Date    APPLICATION OF SPLINT Left 7/12/2018    Procedure: APPLICATION, SPLINT;  Surgeon: iJm Maria MD;  Location: [facility];  Service: Orthopedics;  Laterality: Left;    APPLICATION, DRESSING, WOUND Left 7/12/2018    Performed by [facility] at [facility]    APPLICATION, SPLINT Left 7/12/2018    Performed by [facility] at [facility]    CARPAL TUNNEL RELEASE Right     CHOLECYSTECTOMY      COLONOSCOPY      ESOPHAGEAL DILATION      multiple times    FOOT HARDWARE REMOVAL Left 7/12/2018    Procedure: REMOVAL, HARDWARE, FOOT;  Surgeon: Jim Maria MD;  Location: [facility];  Service: Orthopedics;  Laterality: Left;    HYSTERECTOMY      IRRIGATION AND DEBRIDEMENT OF LOWER EXTREMITY Left 7/12/2018    Procedure: IRRIGATION AND DEBRIDEMENT, LOWER EXTREMITY;  Surgeon: Jim Maria MD;  Location: [facility];  Service: Orthopedics;  Laterality: Left;     IRRIGATION AND DEBRIDEMENT, LOWER EXTREMITY Left 7/12/2018    Performed by Jim [facility] MD at [facility]    LENGTHENING,TENDON,ACHILLES Left 7/12/2018    Performed by Jim Maria MD at [facility]    OPEN REDUCTION AND INTERNAL FIXATION (ORIF) OF FRACTURE OF CALCANEUS Left 7/12/2018    Procedure: ORIF CALCANEUS FRACTURE;  Surgeon: Jim Maria MD;  Location: [facility];  Service: Orthopedics;  Laterality: Left;    OPEN REDUCTION AND INTERNAL FIXATION (ORIF) OF INJURY OF FOOT Left 6/18/2018    Procedure: ORIF, FOOT;  Surgeon: Jim Maria MD;  Location: [facility];  Service: Orthopedics;  Laterality: Left;  Calcaneous    ORIF CALCANEUS FRACTURE Left 7/12/2018    Performed by Jim Maria MD at [facility]    ORIF, FOOT Left 6/18/2018    Performed by Jim Maria MD at [facility]    REMOVAL, HARDWARE, FOOT Left 7/12/2018    Performed by Jim Maria MD at [facility]    TONSILLECTOMY      WOUND DRESSING Left 7/12/2018    Procedure: APPLICATION, DRESSING, WOUND;  Surgeon: Jim Maria MD;  Location: [facility];  Service: Orthopedics;  Laterality: Left;     History reviewed. No pertinent family history.  Social History     Socioeconomic History    Marital status:      Spouse name: Not on file    Number of children: Not on file    Years of education: Not on file    Highest education level: Not on file   Social Needs    Financial resource strain: Not on file    Food insecurity - worry: Not on file    Food insecurity - inability: Not on file    Transportation needs - medical: Not on file    Transportation needs - non-medical: Not on file   Occupational History    Not on file   Tobacco Use    Smoking status: Former Smoker    Smokeless tobacco: Never Used   Substance and Sexual Activity    Alcohol use: Yes     Comment: on weekends    Drug use: No    Sexual activity: Not on file   Other Topics Concern    Not on file   Social History Narrative    Not on file       "  Medication List           Accurate as of 10/31/18  6:20 PM. If you have any questions, ask your nurse or doctor.               CONTINUE taking these medications    ascorbic acid (vitamin C) 1000 MG tablet  Commonly known as:  VITAMIN C     aspirin 81 MG EC tablet  Commonly known as:  ECOTRIN  Take 1 tablet (81 mg total) by mouth once daily.     COMPOUND HORMONE REPLACEMENT     escitalopram oxalate 20 MG tablet  Commonly known as:  LEXAPRO     furosemide 40 MG tablet  Commonly known as:  LASIX     hydrOXYzine HCl 25 MG tablet  Commonly known as:  ATARAX  Take 1 tablet (25 mg total) by mouth every 8 (eight) hours as needed for Itching.     magnesium 250 mg Tab     multivitamin capsule     ondansetron 4 MG Tbdl  Commonly known as:  ZOFRAN-ODT  Take 2 tablets (8 mg total) by mouth every 8 (eight) hours as needed.     oxyCODONE-acetaminophen 5-325 mg per tablet  Commonly known as:  PERCOCET  Take 1-2 tablets every 4-8 hrs for pain control.     pantoprazole 40 MG tablet  Commonly known as:  PROTONIX     polyethylene glycol 17 gram/dose powder  Commonly known as:  GLYCOLAX  Take 17 g by mouth once daily.     UNKNOWN TO PATIENT          Review of patient's allergies indicates:   Allergen Reactions    Codeine      "i dont like the funny feeling it gives me"     Review of Systems   Constitution: Negative for fever.   HENT: Negative for sore throat.    Eyes: Negative for blurred vision.   Cardiovascular: Negative for dyspnea on exertion.   Respiratory: Negative for shortness of breath.    Hematologic/Lymphatic: Does not bruise/bleed easily.   Skin: Negative for itching.   Musculoskeletal: Positive for joint pain.   Gastrointestinal: Negative for vomiting.   Genitourinary: Negative for dysuria.   Neurological: Negative for dizziness.   Psychiatric/Behavioral: The patient has insomnia.        Objective:   Body mass index is 27.06 kg/m².  Vitals:    10/31/18 0730   BP: (!) 144/83   Pulse: 65           General    Nursing note " and vitals reviewed.  Constitutional: She is oriented to person, place, and time. She appears well-developed. No distress.   HENT:   Head: Normocephalic and atraumatic.   Eyes: EOM are normal.   Cardiovascular: Normal rate.    Pulmonary/Chest: Effort normal. No stridor.   Neurological: She is alert and oriented to person, place, and time.   Psychiatric: She has a normal mood and affect. Her behavior is normal.         Left Ankle/Foot Exam     Range of Motion   Ankle Joint  Dorsiflexion: 5   Plantar flexion: 30     Other   Sensation: normal    Comments:  Incision posterior heel healing well. No breakdown, incision healing well, good healing.  SLTI SP DP SURAL SAPH TIB, 2+DP wwp toes, wiggles toes                   Vascular Exam       Left Pulses  Dorsalis Pedis:      2+               EXAMINATION:  XR FOOT COMPLETE 3 VIEW LEFT    CLINICAL HISTORY:  .  Pain, unspecified    TECHNIQUE:  AP, lateral and oblique views of the left foot were performed.    COMPARISON:  09/17/2018 radiographs    FINDINGS:  Calcaneal orthopedic screws are again seen unchanged since the comparison exam.  No periprosthetic fracture or evidence of hardware failure.  There is interval increased blurring of the fracture planes.  No new abnormality.   Impression       As above      Electronically signed by: Jeovanny Andrade MD  Date: 10/31/2018  Time: 08:08            Radiographs / Imaging : Results reviewed by me and interpreted by me, discussed with the patient and / or family       Assessment:     Encounter Diagnoses   Name Primary?    Open displaced avulsion fracture of tuberosity of left calcaneus with routine healing, subsequent encounter Yes    Pain of left heel     Status post surgery         Plan:     Follow up in 3 months for new xrays    She will slowly progress weight bearing in boot and then wean boot into ankle brace as tolerated    Continue PT for ROM, stretching, peroneal strengthening, calf strengthening  Can return to work with  following restrictions:    -75% weight bearing LLE with boot, able to elevate and ICE frequently, no more than 1 hours standing

## 2018-10-31 NOTE — TELEPHONE ENCOUNTER
----- Message from Matthew Aguirre sent at 10/31/2018 10:22 AM CDT -----  Contact: pt   Pt is requesting  A call back from the nurse in regards to the pt boss having question about the paperwork that the Dr filled out for the pt   [phone] (home)

## 2019-01-24 DIAGNOSIS — R52 PAIN: Primary | ICD-10-CM

## 2021-01-18 NOTE — ANESTHESIA POSTPROCEDURE EVALUATION
"Anesthesia Post Evaluation    Patient: Nayana Bledsoe    Procedure(s) Performed: Procedure(s) (LRB):  ORIF, FOOT (Left)    Final Anesthesia Type: general  Patient location during evaluation: PACU  Patient participation: Yes- Able to Participate  Level of consciousness: awake and alert  Post-procedure vital signs: reviewed and stable  Pain management: adequate  Airway patency: patent  PONV status at discharge: No PONV  Anesthetic complications: no      Cardiovascular status: blood pressure returned to baseline  Respiratory status: unassisted  Hydration status: euvolemic  Follow-up not needed.        Visit Vitals  BP (!) 106/59   Pulse 68   Temp 36.6 °C (97.9 °F) (Axillary)   Resp (!) 22   Ht 4' 11" (1.499 m)   Wt 60.8 kg (134 lb)   SpO2 96%   Breastfeeding? No   BMI 27.06 kg/m²       Pain/Jody Score: Pain Assessment Performed: Yes (6/18/2018  7:00 PM)  Presence of Pain: complains of pain/discomfort (6/18/2018  7:10 PM)  Pain Rating Prior to Med Admin: 7 (6/18/2018  7:10 PM)  Jody Score: 10 (6/18/2018  7:00 PM)      " COUNSELING:

## (undated) DEVICE — SEE MEDLINE ITEM 157131

## (undated) DEVICE — NDL SAFETY 25G X 1.5 ECLIPSE

## (undated) DEVICE — DRAPE MOBILE C-ARM

## (undated) DEVICE — SEE MEDLINE ITEM 157117

## (undated) DEVICE — SEE MEDLINE ITEM 146271

## (undated) DEVICE — SEE MEDLINE ITEM 152529

## (undated) DEVICE — SEE MEDLINE ITEM 146298

## (undated) DEVICE — DECANTER VIAL ASEPTIC TRANSFER

## (undated) DEVICE — APPLICATOR CHLORAPREP ORN 26ML

## (undated) DEVICE — GLOVE BIOGEL PI ORTHO PRO 7.5

## (undated) DEVICE — SEE MEDLINE ITEM 157027

## (undated) DEVICE — ALCOHOL 70% ISOP RUBBING 4OZ

## (undated) DEVICE — BIT DRILL CANNL SS 5.0MM 300/2

## (undated) DEVICE — PAD CAST SPECIALIST STRL 4

## (undated) DEVICE — GLOVE 7.5 PROTEXIS PI MICRO

## (undated) DEVICE — SEE MEDLINE ITEM 152523

## (undated) DEVICE — TOURNIQUET SB QC DP 34X4IN

## (undated) DEVICE — DRAPE C-ARMOR EQUIPMENT COVER

## (undated) DEVICE — DRAPE STERI INSTRUMENT 1018

## (undated) DEVICE — SEE MEDLINE ITEM 152622

## (undated) DEVICE — WIRE 2.8 X 300 MM THREADED
Type: IMPLANTABLE DEVICE | Site: HEEL | Status: NON-FUNCTIONAL
Removed: 2018-06-18

## (undated) DEVICE — SOL NS 1000CC

## (undated) DEVICE — GLOVE PROTEXIS HYDROGEL SZ7.5

## (undated) DEVICE — BIT DRILL 3.2MM CANNULATED

## (undated) DEVICE — SYR 10CC LUER LOCK

## (undated) DEVICE — GAUZE SPONGE 4X4 12PLY

## (undated) DEVICE — Device

## (undated) DEVICE — DRAPE STERI U-SHAPED 47X51IN

## (undated) DEVICE — KIT PREVENA PLUS

## (undated) DEVICE — PAD CAST SPECIALIST STRL 6

## (undated) DEVICE — COVER OVERHEAD SURG LT BLUE

## (undated) DEVICE — CONTAINER SPECIMEN STRL 4OZ

## (undated) DEVICE — SEE MEDLINE ITEM 157216

## (undated) DEVICE — K-WIRE THRD TRCR PT 4.5MM 1.6X
Type: IMPLANTABLE DEVICE | Site: HEEL | Status: NON-FUNCTIONAL
Removed: 2018-06-18

## (undated) DEVICE — IMPLANTABLE DEVICE
Type: IMPLANTABLE DEVICE | Site: HEEL | Status: NON-FUNCTIONAL
Removed: 2018-07-12

## (undated) DEVICE — ELECTRODE REM PLYHSV RETURN 9

## (undated) DEVICE — DRAPE PLASTIC U 60X72

## (undated) DEVICE — MANIFOLD 4 PORT

## (undated) DEVICE — IMPLANTABLE DEVICE
Type: IMPLANTABLE DEVICE | Site: HEEL | Status: NON-FUNCTIONAL
Removed: 2018-06-18